# Patient Record
Sex: MALE | Race: WHITE | NOT HISPANIC OR LATINO | Employment: FULL TIME | URBAN - NONMETROPOLITAN AREA
[De-identification: names, ages, dates, MRNs, and addresses within clinical notes are randomized per-mention and may not be internally consistent; named-entity substitution may affect disease eponyms.]

---

## 2017-01-09 ENCOUNTER — AMBULATORY - GICH (OUTPATIENT)
Dept: SCHEDULING | Facility: OTHER | Age: 27
End: 2017-01-09

## 2017-01-09 ENCOUNTER — OFFICE VISIT (OUTPATIENT)
Dept: OTOLARYNGOLOGY | Facility: CLINIC | Age: 27
End: 2017-01-09

## 2017-01-09 DIAGNOSIS — J34.89 NASAL OBSTRUCTION: Primary | ICD-10-CM

## 2017-01-09 PROBLEM — H52.10 MYOPIA: Status: ACTIVE | Noted: 2017-01-09

## 2017-01-09 ASSESSMENT — PAIN SCALES - GENERAL: PAINLEVEL: NO PAIN (0)

## 2017-01-09 NOTE — Clinical Note
1/9/2017       RE: Ramo Freeman  [address] [facility] [address]     Dear Colleague,    Thank you for referring your patient, Ramo Freeman, to the [place] [facility] at [facility]. Please see a copy of my visit note below.    Facial Plastic and Reconstructive Surgery    Ramo Freeman is doing very well  His swelling is down significantly, and his breathing is optimal-  He is very pleased.    Anterior rhinoscopy shows a septum that is midline and widely patent nasal passages  His lining has recovered significantly.  Good tip support, good dorsum    I am very pleased and so is he.  I will see him back in a year but don't anticipate any changes.    Again, thank you for allowing me to participate in the care of your patient.      Sincerely,    Manda Arguello MD

## 2017-01-09 NOTE — PROGRESS NOTES
Facial Plastic and Reconstructive Surgery    Ramo Freeman is doing very well  His swelling is down significantly, and his breathing is optimal-  He is very pleased.    Anterior rhinoscopy shows a septum that is midline and widely patent nasal passages  His lining has recovered significantly.  Good tip support, good dorsum    I am very pleased and so is he.  I will see him back in a year but don't anticipate any changes.

## 2017-01-09 NOTE — PATIENT INSTRUCTIONS
Plan of Care:  1. Follow up in 6 months    Clinic Information:  1. To schedule an appointment please call [phone], option 1  2. To talk to a Triage RN please call [phone] select option 3  3. To speak to Dr. Arguello's nurse, Hemalatha, please call [phone]    Hemalatha Saravia RN  1/9/2017 4:02 PM

## 2017-01-09 NOTE — NURSING NOTE
Chief Complaint   Patient presents with     RECHECK     6 month f/u     Sara Sosa Medical Assistant

## 2017-04-07 ENCOUNTER — HISTORY (OUTPATIENT)
Dept: FAMILY MEDICINE | Facility: OTHER | Age: 27
End: 2017-04-07

## 2017-04-07 ENCOUNTER — OFFICE VISIT - GICH (OUTPATIENT)
Dept: FAMILY MEDICINE | Facility: OTHER | Age: 27
End: 2017-04-07

## 2017-04-07 DIAGNOSIS — J20.8 ACUTE BRONCHITIS DUE TO OTHER SPECIFIED ORGANISMS: ICD-10-CM

## 2017-04-07 DIAGNOSIS — F41.1 GENERALIZED ANXIETY DISORDER: ICD-10-CM

## 2017-04-07 DIAGNOSIS — H65.112: ICD-10-CM

## 2017-04-07 ASSESSMENT — ANXIETY QUESTIONNAIRES
GAD7 TOTAL SCORE: 9
5. BEING SO RESTLESS THAT IT IS HARD TO SIT STILL: SEVERAL DAYS
6. BECOMING EASILY ANNOYED OR IRRITABLE: SEVERAL DAYS
3. WORRYING TOO MUCH ABOUT DIFFERENT THINGS: SEVERAL DAYS
2. NOT BEING ABLE TO STOP OR CONTROL WORRYING: MORE THAN HALF THE DAYS
4. TROUBLE RELAXING: NOT AT ALL
1. FEELING NERVOUS, ANXIOUS, OR ON EDGE: NEARLY EVERY DAY
7. FEELING AFRAID AS IF SOMETHING AWFUL MIGHT HAPPEN: SEVERAL DAYS

## 2017-07-26 ENCOUNTER — AMBULATORY - GICH (OUTPATIENT)
Dept: SCHEDULING | Facility: OTHER | Age: 27
End: 2017-07-26

## 2017-07-26 ENCOUNTER — OFFICE VISIT (OUTPATIENT)
Dept: OTOLARYNGOLOGY | Facility: CLINIC | Age: 27
End: 2017-07-26

## 2017-07-26 VITALS — BODY MASS INDEX: 24.21 KG/M2 | WEIGHT: 164 LBS

## 2017-07-26 DIAGNOSIS — J34.89 NASAL OBSTRUCTION: Primary | ICD-10-CM

## 2017-07-26 ASSESSMENT — PAIN SCALES - GENERAL: PAINLEVEL: NO PAIN (0)

## 2017-07-26 NOTE — LETTER
7/26/2017       RE: Ramo Freeman  [address] [facility] [address]     Dear Colleague,    Thank you for referring your patient, Ramo Freeman, to the [facility] at [facility]. Please see a copy of my visit note below.    Facial Plastic and Reconstructive Surgery    History of Present Illness:  Ramo Freeman presents today a year after septorhinoplasty for nasal obstruction. He is doing very well. He finds his breathing is optimal. He is very pleased. He states the sensation is back in his nasal tip. In general he is doing very well.     Physical Examination:  On examination, he has good stable nasal support. He has good pliability to the nasal tip skin. He has nasal breathing bilaterally with no obstruction. Anterior rhinoscopy demonstrates a straight septum well supported nasal valve bilaterally. He has some crusting anteriorly which is within normal ranges. There is no evidence of any mucopurulence or polyps.    Assessment/Plan: As it has been a year since surgery, routine follow-up is no longer needed, but I can see Kenneth on an as-needed basis if any issues arise. I suspect that he will have an optimal course with nasal functioning after this with no significant issues or complications.       SLP/ms      Sincerely,    Manda Arguello MD

## 2017-07-26 NOTE — PROGRESS NOTES
Facial Plastic and Reconstructive Surgery    History of Present Illness:  Ramo Freeman presents today a year after septorhinoplasty for nasal obstruction. He is doing very well. He finds his breathing is optimal. He is very pleased. He states the sensation is back in his nasal tip. In general he is doing very well.     Physical Examination:  On examination, he has good stable nasal support. He has good pliability to the nasal tip skin. He has nasal breathing bilaterally with no obstruction. Anterior rhinoscopy demonstrates a straight septum well supported nasal valve bilaterally. He has some crusting anteriorly which is within normal ranges. There is no evidence of any mucopurulence or polyps.    Assessment/Plan: As it has been a year since surgery, routine follow-up is no longer needed, but I can see Kenneth on an as-needed basis if any issues arise. I suspect that he will have an optimal course with nasal functioning after this with no significant issues or complications.       SLP/ms

## 2017-07-26 NOTE — LETTER
7/26/2017      RE: Ramo Freeman  [address] [facility] [address]       Facial Plastic and Reconstructive Surgery    History of Present Illness:  Ramo Freeman presents today a year after septorhinoplasty for nasal obstruction. He is doing very well. He finds his breathing is optimal. He is very pleased. He states the sensation is back in his nasal tip. In general he is doing very well.     Physical Examination:  On examination, he has good stable nasal support. He has good pliability to the nasal tip skin. He has nasal breathing bilaterally with no obstruction. Anterior rhinoscopy demonstrates a straight septum well supported nasal valve bilaterally. He has some crusting anteriorly which is within normal ranges. There is no evidence of any mucopurulence or polyps.    Assessment/Plan: As it has been a year since surgery, routine follow-up is no longer needed, but I can see Kenneth on an as-needed basis if any issues arise. I suspect that he will have an optimal course with nasal functioning after this with no significant issues or complications.       SLP/ms            Manda Arguello MD

## 2017-07-26 NOTE — MR AVS SNAPSHOT
After Visit Summary   7/26/2017    Ramo Freeman    MRN: [id number]           Patient Information     Date Of Birth          1990        Visit Information        Provider Department      7/26/2017 4:40 PM Manda Arguello MD [facility] Ear Nose [facility] Throat        Today's Diagnoses     Nasal obstruction    -  1       Follow-ups after your visit        Who to contact     Please call your clinic at [phone] to:    Ask questions about your health    Make or cancel appointments    Discuss your medicines    Learn about your test results    Speak to your doctor   If you have compliments or concerns about an experience at your clinic, or if you wish to file a complaint, please contact [facility] Patient Relations at [phone] or email us at [email or web address]         Additional Information About Your Visit        MyChart Information     [employer] gives you secure access to your electronic health record. If you see a primary care provider, you can also send messages to your care team and make appointments. If you have questions, please call your primary care clinic.  If you do not have a primary care provider, please call [phone] and they will assist you.      [employer] is an electronic gateway that provides easy, online access to your medical records. With [employer], you can request a clinic appointment, read your test results, renew a prescription or communicate with your care team.     To access your existing account, please contact your [facility] or call [phone] for assistance.        Care EveryWhere ID     This is your Care EveryWhere ID. This could be used by other organizations to access your [facility] medical records  [id number]        Your Vitals Were     BMI (Body Mass Index)                   24.21 kg/m2            Blood Pressure from Last 3 Encounters:   11/23/15 137/72   11/18/15 131/80   07/09/15 128/81     Weight from Last 3 Encounters:   07/26/17 74.4 kg (164 lb)   11/23/15 66.7 kg (147 lb 0.8 oz)   11/18/15 67.7 kg (149 lb 3.2 oz)              Today, you had the following     No orders found for display       Primary Care Provider Office Phone # Fax #    Sotero Quach [phone]       [facility] [address] [place] [address]        Equal Access to Services     SOILA MCKEON : Hadii aad ku hadasho Soomaali, waaxda luqadaha, qaybta kaalmada adeegyada, waxay idiin hayaan adeeg emoryaramasoud lamartine . So [facility] [phone].    ATENCIÓN: Si habla español, tiene a connor disposición servicios gratuitos de asistencia lingüística. [facility] [phone].    We comply with applicable federal civil rights laws and [place] laws. We do not discriminate on the basis of race, color, national origin, age, disability sex, sexual orientation or gender identity.            Thank you!     Thank you for choosing [facility] EAR NOSE AND THROAT  for your care. Our goal is always to provide you with excellent care. Hearing back from our patients is one way we can continue to improve our services. Please take a few minutes to complete the written survey that you may receive in the mail after your visit with us. Thank you!             Your Updated Medication List - Protect others around you: Learn how to safely use, store and throw away your medicines at www.disposemymeds.org.          This list is accurate as of: 7/26/17 11:59 PM.  Always use your most recent med list.                   Brand Name Dispense Instructions for use Diagnosis    FAMOTIDINE PO      Take 20 mg by mouth daily as needed for heartburn        sertraline 50 MG tablet    ZOLOFT     Take 100 mg by mouth daily        VITAMIN D (CHOLECALCIFEROL) PO      Take 4,000 Units by mouth daily

## 2017-07-27 ENCOUNTER — HISTORY (OUTPATIENT)
Dept: FAMILY MEDICINE | Facility: OTHER | Age: 27
End: 2017-07-27

## 2017-07-27 ENCOUNTER — OFFICE VISIT - GICH (OUTPATIENT)
Dept: FAMILY MEDICINE | Facility: OTHER | Age: 27
End: 2017-07-27

## 2017-07-27 DIAGNOSIS — J03.90 ACUTE TONSILLITIS: ICD-10-CM

## 2017-07-27 DIAGNOSIS — R50.9 FEVER: ICD-10-CM

## 2017-07-27 DIAGNOSIS — J02.9 ACUTE PHARYNGITIS: ICD-10-CM

## 2017-07-27 DIAGNOSIS — J02.0 STREPTOCOCCAL PHARYNGITIS: ICD-10-CM

## 2017-07-27 LAB — STREP A ANTIGEN - HISTORICAL: NEGATIVE

## 2017-08-24 ENCOUNTER — COMMUNICATION - GICH (OUTPATIENT)
Dept: FAMILY MEDICINE | Facility: OTHER | Age: 27
End: 2017-08-24

## 2017-08-24 DIAGNOSIS — J30.89 OTHER ALLERGIC RHINITIS: ICD-10-CM

## 2017-12-27 NOTE — PROGRESS NOTES
Patient Information     Patient Name MRN Sex     Ramo Freeman [id number] Male 1990      Progress Notes by Fannie Hanna NP at 2017 12:30 PM     Author:  Fannie Hanna NP Service:  (none) Author Type:  PHYS- Nurse Practitioner     Filed:  2017  9:09 PM Encounter Date:  2017 Status:  Signed     :  Fannie Hanna NP (PHYS- Nurse Practitioner)            HPI:    Ramo Freeman is a 26 y.o. male who presents to clinic today for strep testing.   Sore throat x 3 days.  Painful to swallow.  Pain at its worst today.   Fevers started today.   Currently 101.5.  No runny or stuffy nose.  No cough.  Ear pressure/pain with swallowing.  Some headaches.  No nausea or vomiting.  No stomach aches.  Appetite decreased, no solids today, drinking fluids.  Energy decreased, low.  No tylenol or ibuprofen.            Past Medical History:     Diagnosis  Date     Erythroblastopenia (HC)     of childhood      Otitis media     Past history of recurrent - ruptured right tympanic membrane, and PE tubes.        Refusal of blood transfusions as patient is Baptism      Past Surgical History:      Procedure  Laterality Date     MYRINGOTOMY  99    and PE tubes times two.       Social History     Substance Use Topics       Smoking status: Never Smoker     Smokeless tobacco: Never Used     Alcohol use No     Current Outpatient Prescriptions       Medication  Sig Dispense Refill     albuterol HFA (PROAIR HFA) 90 mcg/actuation inhaler Inhale 2 Puffs by mouth every 4 hours if needed. 1 Inhaler 0     fexofenadine (ALLEGRA) 60 mg tablet Take 1 tablet by mouth 2 times daily. 180 tablet 3     fluticasone (50 mcg per actuation) nasal solution (FLONASE) Inhale 1 Spray into both nostrils once daily. 1 Bottle 12     sertraline (ZOLOFT) 100 mg tablet Take 1.5 tablets by mouth once daily. 135 tablet 3     No current facility-administered medications for this visit.      Medications have been  "reviewed by me and are current to the best of my knowledge and ability.    Allergies      Allergen   Reactions     Nyquil [Doxylamin-Pse-Dm-Acetaminophen]  GI Bleeding     Causes hyperactivity and then emesis        Past medical history, past surgical history, current medications and allergies reviewed and accurate to the best of my knowledge.        ROS:  Refer to HPI    /68  Pulse (!) 121  Temp (!) 101.5  F (38.6  C) (Tympanic)   Ht 1.765 m (5' 9.49\")  Wt 73.6 kg (162 lb 3.2 oz)  BMI 23.62 kg/m2    EXAM:  General Appearance: Well appearing adult male, appropriate appearance for age. No acute distress  Head: normocephalic, atraumatic  Ears: Left TM with bony landmarks appreciated, no erythema, no effusion, no bulging, no purulence.  Right TM with bony landmarks appreciated, no erythema, no effusion, no bulging, no purulence.   Left auditory canal clear.  Right auditory canal clear.  Normal external ears, non tender.  Eyes: conjunctivae normal, no drainage  Orophayrnx: moist mucous membranes, posterior pharynx with bright erythema, tonsils with 2+ hypertrophy with bright erythema and white exudates, no petechiae    Neck: mild bilateral tonsillar lymph node enlargement and tenderness  Respiratory: normal chest wall and respirations.  Normal effort.  Clear to auscultation bilaterally, no wheezing, crackles or rhonchi.  No increased work of breathing.  No cough appreciated  Cardiac: RRR with no murmurs  Musculoskeletal:  Normal gait.  Equal movement of bilateral upper extremities.  Equal movement of bilateral lower extremities.    Psychological: normal affect, alert and pleasant      Labs:  Results for orders placed or performed in visit on 07/27/17      RAPID STREP WITH REFLEX CULTURE      Result  Value Ref Range    STREP A ANTIGEN           Negative Negative         ASSESSMENT/PLAN:    ICD-10-CM    1. Sore throat J02.9 RAPID STREP WITH REFLEX CULTURE      RAPID STREP WITH REFLEX CULTURE   2. Strep " pharyngitis J02.0 penicillin v potassium (PEN-VEE K) 500 mg tablet   3. Tonsillitis with exudate J03.90    4. Fever in adult R50.9          Negative rapid strep test  Exam consistent with strep infection and fever of 101.5  Pen Vee K 500 mg BID x 10 days   New toothbrush in 2-3 days   Encouraged fluids  Symptomatic treatment - salt water gargles, honey, elevation, lozenges, etc   Tylenol or ibuprofen PRN  Follow up if symptoms persist or worsen or concerns          Patient Instructions   Penicillin twice daily x 10 days     New toothbrush in 2 days    Salt water gargles       Tylenol or ibuprofen      Drink plenty of fluids      Follow up if worsening or concerns

## 2017-12-28 NOTE — TELEPHONE ENCOUNTER
Patient Information     Patient Name MRN Ramo Carr [id number] Male 1990      Telephone Encounter by Araceli Gallardo RN at 2017  4:05 PM     Author:  Araceli Gallardo RN Service:  (none) Author Type:  NURS- Registered Nurse     Filed:  2017  4:07 PM Encounter Date:  2017 Status:  Signed     :  Araceli Gallardo RN (NURS- Registered Nurse)            Antihistamines:    Office visit in the past 12 months.    Last visit with YINA CHRISTIE was on: 2017 in [facility]  Next visit with YINA CHRISTIE is on: No future appointment listed with this provider  Next visit with Family Practice is on: No future appointment listed in this department    Max refills 12 months from last office visit.        Prescription refilled per RN Medication Refill Policy.................... Araceli Gallardo RN ....................  2017   4:06 PM

## 2017-12-28 NOTE — PATIENT INSTRUCTIONS
Patient Information     Patient Name MRN Sex Ramo Godinez [id number] Male 1990      Patient Instructions by Fannie Hanna NP at 2017 12:30 PM     Author:  Fannie Hanna NP Service:  (none) Author Type:  PHYS- Nurse Practitioner     Filed:  2017 12:57 PM Encounter Date:  2017 Status:  Signed     :  Fannie Hanna NP (PHYS- Nurse Practitioner)            Penicillin twice daily x 10 days     New toothbrush in 2 days    Salt water gargles       Tylenol or ibuprofen      Drink plenty of fluids      Follow up if worsening or concerns

## 2017-12-30 NOTE — NURSING NOTE
Patient Information     Patient Name MRN Ramo Carr [id number] Male 1990      Nursing Note by Juanita Newton at 2017 12:30 PM     Author:  Juanita Newton Service:  (none) Author Type:  NURS- Student Practical Nurse     Filed:  2017 12:51 PM Encounter Date:  2017 Status:  Signed     :  Juanita Newton (NURS- Student Practical Nurse)            Patient presents with sore throat, dizziness, fever for 3 days. Juanita Newton LPN .............2017  12:26 PM

## 2018-01-04 NOTE — NURSING NOTE
Patient Information     Patient Name MRN Ramo Carr [id number] Male 1990      Nursing Note by Sana Johns at 2017  9:30 AM     Author:  Sana Johns Service:  (none) Author Type:  (none)     Filed:  2017  9:33 AM Encounter Date:  2017 Status:  Signed     :  Sana Johns            Coming in with symptoms of allergies, watery eyes, cough, itchy eyes, also anxiety medication is not working.  Sana Johns ....................  2017   9:28 AM

## 2018-01-04 NOTE — PROGRESS NOTES
"Patient Information     Patient Name MRN Ramo Carr [id number] Male 1990      Progress Notes by Sotero Quach MD at 2017  9:30 AM     Author:  Sotero Quach MD Service:  (none) Author Type:  Physician     Filed:  2017  9:51 AM Encounter Date:  2017 Status:  Signed     :  Sotero Quach MD (Physician)            SUBJECTIVE:    Ramo Freeman is a 26 y.o. male who presents for possible allergy symptoms as well as worsening anxiety    HPI   Has had coughing with post nasal drainage lately.  Sneezing and maxillary sinus pressure.  Itchy eyes and throat.  Decreased hearing bilateral.  Symptoms for about 2 weeks.  Often gets this in the spring and fall, but never this severe.  Had a fever 2 days last week.  Took ibuprofen and it resolved.    Lately has been much more sleepy and feels the zoloft is not working.  Feeling down and depressed a \"little bit\".  Seems to worry about \"a lot of things\".  Cannot really think of examples, simply feels on edge nearly all the time.  Has been sharing these feelings with his parents, who he finds very helpful.  He is thinking about meeting with a counselor as well.  Sometimes has a hard time sleeping, tries essential oils with some relief.    Allergies      Allergen   Reactions     Nyquil [Doxylamin-Pse-Dm-Acetaminophen]  GI Bleeding     Causes hyperactivity and then emesis    ,   Current Outpatient Prescriptions on File Prior to Visit       Medication  Sig Dispense Refill     fexofenadine (ALLEGRA) 60 mg tablet Take 1 tablet by mouth 2 times daily. 180 tablet 3     fluticasone (50 mcg per actuation) nasal solution (FLONASE) Inhale 1 Spray into both nostrils once daily. 1 Bottle 12     No current facility-administered medications on file prior to visit.    ,   Past Medical History:     Diagnosis  Date     Erythroblastopenia (HC)     of childhood      Otitis media     Past history of recurrent - ruptured right tympanic membrane, and " PE tubes.        Refusal of blood transfusions as patient is Rastafari     and   Past Surgical History:      Procedure  Laterality Date     MYRINGOTOMY  06/18/99    and PE tubes times two.         REVIEW OF SYSTEMS:  Review of Systems   Constitutional: Positive for fever.   HENT: Positive for congestion.    Respiratory: Positive for cough. Negative for shortness of breath.    Skin: Negative for itching and rash.   Neurological: Positive for headaches.   Psychiatric/Behavioral: Positive for depression. Negative for substance abuse. The patient is nervous/anxious and has insomnia.        OBJECTIVE:  /62  Temp 98.3  F (36.8  C) (Tympanic)  Resp 16  Wt 69.8 kg (153 lb 12.8 oz)  BMI 23.21 kg/m2    EXAM:   Physical Exam   Constitutional: He is well-developed, well-nourished, and in no distress. No distress.   HENT:   Head: Normocephalic and atraumatic.   Left tympanic membrane with significant mucoid white effusion, right with serous effusion   Eyes: Conjunctivae are normal. Pupils are equal, round, and reactive to light.   Pulmonary/Chest: Effort normal. No respiratory distress. He has wheezes. He has rales.   Bilateral inspiratory wheeze and rales   Skin: Skin is warm and dry. No rash noted. He is not diaphoretic. No erythema.   Psychiatric: Memory, affect and judgment normal.     BLAISE-7 ANXIETY SCREENING 4/7/2017   BLAISE date (doc flow) 4/7/2017   Nervous, anxious 3   Cannot stop worrying 2   Worry about different things 1   Cannot relax 0   Feeling restless 1   Easily annoyed/irritated 1   Afraid of awful event 1   Score 9   Severity mild anxiety       ASSESSMENT/PLAN:    ICD-10-CM    1. Acute viral bronchitis J20.8 albuterol HFA (PROAIR HFA) 90 mcg/actuation inhaler   2. BLAISE (generalized anxiety disorder) F41.1 sertraline (ZOLOFT) 100 mg tablet   3. Acute mucoid otitis media of left ear H65.112         Plan:  The ear findings along with the fever make me think he had either influenza or an acute otitis  media last week.  Sine no fevers now for 1 week, will simply need to treat symptoms with the inhaler.  He might have seasonal allergic rhinitis on top of this and as such is also more prone to katherine asthma.  If not improving in a few weeks, consider spirometry.  If fever returns, then needs a chest radiograph.      For the anxiety, will increase the zoloft to 150 mg daily and he is now ready to start counseling.  I gave him the list of counselors in the area.  Follow up with me as needed on this.    Sotero Quach MD ....................  4/7/2017   9:50 AM

## 2018-01-27 VITALS
HEIGHT: 69 IN | HEART RATE: 121 BPM | SYSTOLIC BLOOD PRESSURE: 120 MMHG | BODY MASS INDEX: 24.02 KG/M2 | SYSTOLIC BLOOD PRESSURE: 112 MMHG | WEIGHT: 162.2 LBS | DIASTOLIC BLOOD PRESSURE: 68 MMHG | RESPIRATION RATE: 16 BRPM | TEMPERATURE: 98.3 F | DIASTOLIC BLOOD PRESSURE: 62 MMHG | WEIGHT: 153.8 LBS | TEMPERATURE: 101.5 F | BODY MASS INDEX: 22.7 KG/M2

## 2018-01-29 ASSESSMENT — ANXIETY QUESTIONNAIRES: GAD7 TOTAL SCORE: 9

## 2018-01-30 ENCOUNTER — DOCUMENTATION ONLY (OUTPATIENT)
Dept: FAMILY MEDICINE | Facility: OTHER | Age: 28
End: 2018-01-30

## 2018-01-30 RX ORDER — ALBUTEROL SULFATE 90 UG/1
2 AEROSOL, METERED RESPIRATORY (INHALATION) EVERY 4 HOURS PRN
COMMUNITY
Start: 2017-04-07

## 2018-01-30 RX ORDER — SERTRALINE HYDROCHLORIDE 100 MG/1
150 TABLET, FILM COATED ORAL DAILY
COMMUNITY
Start: 2017-04-07 | End: 2018-03-31

## 2018-01-30 RX ORDER — FEXOFENADINE HCL 60 MG/1
60 TABLET, FILM COATED ORAL 2 TIMES DAILY
COMMUNITY
Start: 2017-08-28 | End: 2018-06-10

## 2018-03-31 DIAGNOSIS — F41.1 GAD (GENERALIZED ANXIETY DISORDER): Primary | ICD-10-CM

## 2018-03-31 NOTE — LETTER
April 4, 2018      Ramo Freeman  [address] [facility] [address]        Dear Ramo,       This letter is to remind you that you are due for your annual exam and medication management appointment with Sotero Quach. Your last comprehensive visit was more than 12 months ago.    A LIMITED refill of sertraline HCl (Zoloft) has been called into your pharmacy. Additional refills require you to complete this appointment.    Please call the clinic at [phone] to schedule your appointment.    If you should require additional refills before your scheduled appointment, please contact your pharmacy and we will refill your medication until the date of your appointment.    If you are no longer seeing Sotero Quach for primary care, please call to let us know. Doing so will remove you from our call/contact list.      Thank you for choosing [facility] and [facility] for your health care needs.    Sincerely,    Refill RN  [facility]          Sincerely,        Sotero Quach MD

## 2018-04-04 RX ORDER — SERTRALINE HYDROCHLORIDE 100 MG/1
TABLET, FILM COATED ORAL
Qty: 45 TABLET | Refills: 0 | Status: SHIPPED | OUTPATIENT
Start: 2018-04-04 | End: 2018-04-27

## 2018-04-04 NOTE — TELEPHONE ENCOUNTER
[facility] pharmacy and pt request a Rx refill for sertaline HCl (Zoloft).  SSRI protocol passed.  Pt's last exam appt with PCP DR. CHERELLE Quach was on 4-7-17.  Patient is due for medication management appointment. Limited refill provided at this time.  Letter sent for reminder to patient. Prescription refilled per RN Medication Refill Policy.................... Melissa Olivera ....................  4/4/2018   10:20 AM

## 2018-04-27 ENCOUNTER — OFFICE VISIT (OUTPATIENT)
Dept: FAMILY MEDICINE | Facility: OTHER | Age: 28
End: 2018-04-27
Attending: FAMILY MEDICINE
Payer: COMMERCIAL

## 2018-04-27 VITALS
RESPIRATION RATE: 16 BRPM | WEIGHT: 172 LBS | BODY MASS INDEX: 25.48 KG/M2 | DIASTOLIC BLOOD PRESSURE: 62 MMHG | SYSTOLIC BLOOD PRESSURE: 122 MMHG | HEART RATE: 62 BPM | HEIGHT: 69 IN

## 2018-04-27 DIAGNOSIS — F41.1 GAD (GENERALIZED ANXIETY DISORDER): ICD-10-CM

## 2018-04-27 DIAGNOSIS — Z00.00 ROUTINE GENERAL MEDICAL EXAMINATION AT A HEALTH CARE FACILITY: Primary | ICD-10-CM

## 2018-04-27 PROCEDURE — G0463 HOSPITAL OUTPT CLINIC VISIT: HCPCS | Performed by: FAMILY MEDICINE

## 2018-04-27 PROCEDURE — 99395 PREV VISIT EST AGE 18-39: CPT | Performed by: FAMILY MEDICINE

## 2018-04-27 RX ORDER — SERTRALINE HYDROCHLORIDE 100 MG/1
150 TABLET, FILM COATED ORAL DAILY
Qty: 135 TABLET | Refills: 3 | Status: SHIPPED | OUTPATIENT
Start: 2018-04-27 | End: 2019-04-17

## 2018-04-27 ASSESSMENT — ANXIETY QUESTIONNAIRES
GAD7 TOTAL SCORE: 0
5. BEING SO RESTLESS THAT IT IS HARD TO SIT STILL: NOT AT ALL
6. BECOMING EASILY ANNOYED OR IRRITABLE: NOT AT ALL
7. FEELING AFRAID AS IF SOMETHING AWFUL MIGHT HAPPEN: NOT AT ALL
3. WORRYING TOO MUCH ABOUT DIFFERENT THINGS: NOT AT ALL
IF YOU CHECKED OFF ANY PROBLEMS ON THIS QUESTIONNAIRE, HOW DIFFICULT HAVE THESE PROBLEMS MADE IT FOR YOU TO DO YOUR WORK, TAKE CARE OF THINGS AT HOME, OR GET ALONG WITH OTHER PEOPLE: NOT DIFFICULT AT ALL
1. FEELING NERVOUS, ANXIOUS, OR ON EDGE: NOT AT ALL
2. NOT BEING ABLE TO STOP OR CONTROL WORRYING: NOT AT ALL

## 2018-04-27 ASSESSMENT — PAIN SCALES - GENERAL: PAINLEVEL: NO PAIN (0)

## 2018-04-27 ASSESSMENT — PATIENT HEALTH QUESTIONNAIRE - PHQ9: 5. POOR APPETITE OR OVEREATING: NOT AT ALL

## 2018-04-27 NOTE — PROGRESS NOTES
SUBJECTIVE:   CC: Ramo Freeman is an 27 year old male who presents for preventative health visit.     Healthy Habits:    Do you get at least three servings of calcium containing foods daily (dairy, green leafy vegetables, etc.)? yes    Amount of exercise or daily activities, outside of work: 0 day(s) per week    Problems taking medications regularly No    Medication side effects: No    Have you had an eye exam in the past two years? no    Do you see a dentist twice per year? no    Do you have sleep apnea, excessive snoring or daytime drowsiness?no     Has no significant concerns.  Mood is great.  No issues taking Zoloft.  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    Today's PHQ-2 Score:   PHQ-2 ( 1999 Pfizer) 4/27/2018   Q1: Little interest or pleasure in doing things 0   Q2: Feeling down, depressed or hopeless 0   PHQ-2 Score 0       Abuse: Current or Past(Physical, Sexual or Emotional)- No  Do you feel safe in your environment - No    Social History   Substance Use Topics     Smoking status: Never Smoker     Smokeless tobacco: Never Used     Alcohol use No      If you drink alcohol do you typically have >3 drinks per day or >7 drinks per week? No                      Last PSA: No results found for: PSA    Reviewed orders with patient. 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     Procedure and treatment not carried out because of patient's decision for reasons of belief and group pressure     No Comments Provided      Past Surgical History:   Procedure Laterality Date     OTHER SURGICAL HISTORY       "06/18/99,GSP238,MYRINGOTOMY,and PE tubes times two.       ROS:  C: NEGATIVE for fever, chills, change in weight  I: NEGATIVE for worrisome rashes, moles or lesions  E: NEGATIVE for vision changes or irritation  ENT: NEGATIVE for ear, mouth and throat problems  R: NEGATIVE for significant cough or SOB  CV: NEGATIVE for chest pain, palpitations or peripheral edema  GI: NEGATIVE for nausea, abdominal pain, heartburn, or change in bowel habits   male: negative for dysuria, hematuria, decreased urinary stream, erectile dysfunction, urethral discharge  M: NEGATIVE for significant arthralgias or myalgia  N: NEGATIVE for weakness, dizziness or paresthesias  P: NEGATIVE for changes in mood or affect    OBJECTIVE:   /62 (BP Location: Right arm, Patient Position: Sitting, Cuff Size: Adult Regular)  Pulse 62  Resp 16  Ht 5' 9\" (1.753 m)  Wt 172 lb (78 kg)  BMI 25.4 kg/m2  EXAM:  GENERAL: healthy, alert and no distress  EYES: Eyes grossly normal to inspection, PERRL and conjunctivae and sclerae normal  HENT: ear canals and TM's normal, nose and mouth without ulcers or lesions  NECK: no adenopathy, no asymmetry, masses, or scars and thyroid normal to palpation  RESP: lungs clear to auscultation - no rales, rhonchi or wheezes  CV: regular rate and rhythm, normal S1 S2, no S3 or S4, no murmur, click or rub, no peripheral edema and peripheral pulses strong  ABDOMEN: soft, nontender, no hepatosplenomegaly, no masses and bowel sounds normal  MS: no gross musculoskeletal defects noted, no edema  SKIN: no suspicious lesions or rashes  NEURO: Normal strength and tone, mentation intact and speech normal  PSYCH: mentation appears normal, affect normal/bright    ASSESSMENT/PLAN:   (Z00.00) Routine general medical examination at a health care facility  (primary encounter diagnosis)  Comment: healthy  Plan: follow up in 1 year    (F41.1) BLAISE (generalized anxiety disorder)  Comment: stable  Plan: sertraline (ZOLOFT) 100 MG " "tablet        Refilled this for him      COUNSELING:  Reviewed preventive health counseling, as reflected in patient instructions       Regular exercise       Healthy diet/nutrition       Vision screening       reports that he has never smoked. He has never used smokeless tobacco.    Estimated body mass index is 25.4 kg/(m^2) as calculated from the following:    Height as of this encounter: 5' 9\" (1.753 m).    Weight as of this encounter: 172 lb (78 kg).       Counseling Resources:  ATP IV Guidelines  Pooled Cohorts Equation Calculator  FRAX Risk Assessment  ICSI Preventive Guidelines  Dietary Guidelines for Americans, 2010  USDA's MyPlate  ASA Prophylaxis  Lung CA Screening    Sotero Quach MD  [facility]  "

## 2018-04-27 NOTE — MR AVS SNAPSHOT
After Visit Summary   4/27/2018    Ramo Freeman    MRN: [id number]           Patient Information     Date Of Birth          1990        Visit Information        Provider Department      4/27/2018 12:15 PM Sotero Quach MD [facility]        Today's Diagnoses     Routine general medical examination at a health care facility    -  1    BLAISE (generalized anxiety disorder)          Care Instructions      Preventive Health Recommendations  Male Ages 18 - 25     Yearly exam:             See your health care provider every year in order to  o   Review health changes.   o   Discuss preventive care.    o   Review your medicines if your doctor has prescribed any.    You should be tested each year for STDs (sexually transmitted diseases).     Talk to your provider about cholesterol testing.      If you are at risk for diabetes, you should have a diabetes test (fasting glucose).    Shots: Get a flu shot each year. Get a tetanus shot every 10 years.     Nutrition:    Eat at least 5 servings of fruits and vegetables daily.     Eat whole-grain bread, whole-wheat pasta and brown rice instead of white grains and rice.     Talk to your provider about calcium and Vitamin D.     Lifestyle    Exercise for at least 150 minutes a week (30 minutes a day, 5 days a week). This will help you control your weight and prevent disease.     Limit alcohol to one drink per day.     No smoking.     Wear sunscreen to prevent skin cancer.     See your dentist every six months for an exam and cleaning.             Follow-ups after your visit        Follow-up notes from your care team     Return in about 1 year (around 4/27/2019).      Who to contact     If you have questions or need follow up information about today's clinic visit or your schedule please contact [facility] directly at [phone].  Normal or non-critical lab and imaging results will be communicated to you by George  "letter or phone within 4 business days after the clinic has received the results. If you do not hear from us within 7 days, please contact the clinic through [employer] or phone. If you have a critical or abnormal lab result, we will notify you by phone as soon as possible.  Submit refill requests through [employer] or call your pharmacy and they will forward the refill request to us. Please allow 3 business days for your refill to be completed.          Additional Information About Your Visit        [facility] Information     [employer] gives you secure access to your electronic health record. If you see a primary care provider, you can also send messages to your care team and make appointments. If you have questions, please call your primary care clinic.  If you do not have a primary care provider, please call [phone] and they will assist you.        Care EveryWhere ID     This is your Care EveryWhere ID. This could be used by other organizations to access your [place] medical records  [id number]        Your Vitals Were     Pulse Respirations Height BMI (Body Mass Index)          62 16 5' 9\" (1.753 m) 25.4 kg/m2         Blood Pressure from Last 3 Encounters:   04/27/18 122/62   07/27/17 112/68   04/07/17 120/62    Weight from Last 3 Encounters:   04/27/18 172 lb (78 kg)   07/27/17 162 lb 3.2 oz (73.6 kg)   07/26/17 164 lb (74.4 kg)              Today, you had the following     No orders found for display         Today's Medication Changes          These changes are accurate as of 4/27/18 12:27 PM.  If you have any questions, ask your nurse or doctor.               These medicines have changed or have updated prescriptions.        Dose/Directions    sertraline 100 MG tablet   Commonly known as:  ZOLOFT   This may have changed:  See the new instructions.   Used for:  BLAISE (generalized anxiety disorder)   Changed by:  Sotero Quach MD        Dose:  150 mg   Take 1.5 tablets (150 mg) by mouth daily   Quantity:  135 tablet "   Refills:  3            Where to get your medicines      These medications were sent to [facility] [address] [facility] - [place], [place] - [address]  [address] [facility] [address]     Phone:  [phone]     sertraline 100 MG tablet                Primary Care Provider Office Phone # Fax #    Sotero Quach [phone]       [address]  [facility] [address]        Equal Access to Services     RYAN MCKEON : Hadii aad ku hadasho Soomaali, waaxda luqadaha, qaybta kaalmada adeegyada, waxay idiin hayaan emlo lechuga . So [facility] [phone].    ATENCIÓN: Si habla español, tiene a connor disposición servicios gratuitos de asistencia lingüística. Llame al [phone].    We comply with applicable federal civil rights laws and [place] laws. We do not discriminate on the basis of race, color, national origin, age, disability, sex, sexual orientation, or gender identity.            Thank you!     Thank you for choosing [facility]  for your care. Our goal is always to provide you with excellent care. Hearing back from our patients is one way we can continue to improve our services. Please take a few minutes to complete the written survey that you may receive in the mail after your visit with us. Thank you!             Your Updated Medication List - Protect others around you: Learn how to safely use, store and throw away your medicines at www.disposemymeds.org.          This list is accurate as of 4/27/18 12:27 PM.  Always use your most recent med list.                   Brand Name Dispense Instructions for use Diagnosis    albuterol 108 (90 Base) MCG/ACT Inhaler    PROAIR HFA/PROVENTIL HFA/VENTOLIN HFA     Inhale 2 puffs into the lungs every 4 hours as needed        ALLEGRA ALLERGY 60 MG tablet   Generic drug:  fexofenadine      Take 60 mg by mouth 2 times daily        FAMOTIDINE PO      Take 20 mg by mouth daily as needed for heartburn         sertraline 100 MG tablet    ZOLOFT    135 tablet    Take 1.5 tablets (150 mg) by mouth daily    BLAISE (generalized anxiety disorder)       VITAMIN D (CHOLECALCIFEROL) PO      Take 4,000 Units by mouth daily

## 2018-04-28 ASSESSMENT — ANXIETY QUESTIONNAIRES: GAD7 TOTAL SCORE: 0

## 2018-06-10 DIAGNOSIS — J30.9 ALLERGIC RHINITIS: Primary | ICD-10-CM

## 2018-06-13 RX ORDER — FEXOFENADINE HCL 60 MG/1
TABLET, FILM COATED ORAL
Qty: 180 TABLET | Refills: 2 | Status: SHIPPED | OUTPATIENT
Start: 2018-06-13 | End: 2019-09-19

## 2018-06-13 NOTE — TELEPHONE ENCOUNTER
"Requested Prescriptions   Pending Prescriptions Disp Refills     fexofenadine (ALLEGRA) 60 MG tablet [Pharmacy Med Name: FEXOFENADINE HCL 60 MG TABLET] 180 tablet 2     Sig: TAKE 1 TABLET BY MOUTH TWICE A DAY    Antihistamines Protocol Passed    6/10/2018 11:19 AM       Passed - Patient is 3-64 years of age    Apply weight-based dosing for peds patients age 3 - 12 years of age.    Forward request to provider for patients under the age of 3 or over the age of 64.         Passed - Recent (12 mo) or future (30 days) visit within the authorizing provider's specialty    Patient had office visit in the last 12 months or has a visit in the next 30 days with authorizing provider or within the authorizing provider's specialty.  See \"Patient Info\" tab in inbasket, or \"Choose Columns\" in Meds & Orders section of the refill encounter.          Refilled per RN refill protocol. Rosi Peter RN on 6/13/2018 at 9:00 AM     "

## 2019-01-10 ENCOUNTER — DOCUMENTATION ONLY (OUTPATIENT)
Dept: OTHER | Facility: CLINIC | Age: 29
End: 2019-01-10

## 2019-04-17 DIAGNOSIS — F41.1 GAD (GENERALIZED ANXIETY DISORDER): ICD-10-CM

## 2019-04-18 RX ORDER — SERTRALINE HYDROCHLORIDE 100 MG/1
150 TABLET, FILM COATED ORAL DAILY
Qty: 135 TABLET | Refills: 0 | Status: SHIPPED | OUTPATIENT
Start: 2019-04-18 | End: 2019-07-18

## 2019-04-18 NOTE — TELEPHONE ENCOUNTER
"Requested Prescriptions   Pending Prescriptions Disp Refills     sertraline (ZOLOFT) 100 MG tablet [Pharmacy Med Name: SERTRALINE  MG TABLET] 135 tablet 3     Sig: TAKE 1.5 TABLETS (150 MG) BY MOUTH DAILY       SSRIs Protocol Passed - 4/17/2019  3:40 PM        Passed - Recent (12 mo) or future (30 days) visit within the authorizing provider's specialty     Patient had office visit in the last 12 months or has a visit in the next 30 days with authorizing provider or within the authorizing provider's specialty.  See \"Patient Info\" tab in inbasket, or \"Choose Columns\" in Meds & Orders section of the refill encounter.              Passed - Medication is active on med list        Passed - Patient is age 18 or older        Pt is due to be seen in the next month. Prescription approved per [facility] Refill Protocol.  Rosi Peter RN on 4/18/2019 at 4:11 PM   "

## 2019-07-18 DIAGNOSIS — F41.1 GAD (GENERALIZED ANXIETY DISORDER): ICD-10-CM

## 2019-07-18 NOTE — LETTER
July 23, 2019      Ramo Freeman  [address] [address]        Dear Ramo,     A LIMITED refill of sertraline has been called into your pharmacy.    Additional refills require an appointment with Sotero Quach MD.  Please call the clinic at [phone] to schedule your appointment.    Thank you,        The Refill Nurse  [facility]

## 2019-07-23 RX ORDER — SERTRALINE HYDROCHLORIDE 100 MG/1
150 TABLET, FILM COATED ORAL DAILY
Qty: 135 TABLET | Refills: 0 | Status: SHIPPED | OUTPATIENT
Start: 2019-07-23 | End: 2019-09-19

## 2019-07-23 NOTE — TELEPHONE ENCOUNTER
"Refill request from [facility] for sertraline 100 mg tablet  Last refill  4/18/19 for #135 and 0R  Last OV with PCP  4/27/18  No future appts noted      Requested Prescriptions   Pending Prescriptions Disp Refills     sertraline (ZOLOFT) 100 MG tablet [Pharmacy Med Name: SERTRALINE  MG TABLET] 45 tablet 2     Sig: TAKE 1.5 TABLETS (150 MG) BY MOUTH DAILY       SSRIs Protocol Failed - 7/18/2019  9:42 AM        Failed - Recent (12 mo) or future (30 days) visit within the authorizing provider's specialty     Patient had office visit in the last 12 months or has a visit in the next 30 days with authorizing provider or within the authorizing provider's specialty.  See \"Patient Info\" tab in inbasket, or \"Choose Columns\" in Meds & Orders section of the refill encounter.         Pt due for annual visit.  90 day umer period refill and letter sent.  Prescription approved per [facility] Refill Protocol.  Melissa Hamilton RN............................ 7/23/2019 11:30 AM    "

## 2019-09-19 ENCOUNTER — OFFICE VISIT (OUTPATIENT)
Dept: FAMILY MEDICINE | Facility: OTHER | Age: 29
End: 2019-09-19
Attending: FAMILY MEDICINE
Payer: COMMERCIAL

## 2019-09-19 VITALS
HEIGHT: 69 IN | WEIGHT: 169.4 LBS | TEMPERATURE: 98.7 F | DIASTOLIC BLOOD PRESSURE: 62 MMHG | RESPIRATION RATE: 14 BRPM | SYSTOLIC BLOOD PRESSURE: 126 MMHG | OXYGEN SATURATION: 97 % | BODY MASS INDEX: 25.09 KG/M2 | HEART RATE: 84 BPM

## 2019-09-19 DIAGNOSIS — J30.9 ALLERGIC RHINITIS, UNSPECIFIED SEASONALITY, UNSPECIFIED TRIGGER: ICD-10-CM

## 2019-09-19 DIAGNOSIS — F41.1 GAD (GENERALIZED ANXIETY DISORDER): Primary | ICD-10-CM

## 2019-09-19 PROCEDURE — 99213 OFFICE O/P EST LOW 20 MIN: CPT | Performed by: FAMILY MEDICINE

## 2019-09-19 PROCEDURE — G0463 HOSPITAL OUTPT CLINIC VISIT: HCPCS

## 2019-09-19 RX ORDER — SERTRALINE HYDROCHLORIDE 100 MG/1
150 TABLET, FILM COATED ORAL DAILY
Qty: 135 TABLET | Refills: 3 | Status: SHIPPED | OUTPATIENT
Start: 2019-09-19 | End: 2020-09-24

## 2019-09-19 RX ORDER — SERTRALINE HYDROCHLORIDE 100 MG/1
150 TABLET, FILM COATED ORAL DAILY
Qty: 135 TABLET | Refills: 0 | Status: SHIPPED | OUTPATIENT
Start: 2019-09-19 | End: 2019-09-19

## 2019-09-19 RX ORDER — FEXOFENADINE HCL 60 MG/1
60 TABLET, FILM COATED ORAL 2 TIMES DAILY
Qty: 180 TABLET | Refills: 3 | Status: SHIPPED | OUTPATIENT
Start: 2019-09-19 | End: 2020-09-24

## 2019-09-19 ASSESSMENT — ANXIETY QUESTIONNAIRES
2. NOT BEING ABLE TO STOP OR CONTROL WORRYING: NOT AT ALL
GAD7 TOTAL SCORE: 0
7. FEELING AFRAID AS IF SOMETHING AWFUL MIGHT HAPPEN: NOT AT ALL
IF YOU CHECKED OFF ANY PROBLEMS ON THIS QUESTIONNAIRE, HOW DIFFICULT HAVE THESE PROBLEMS MADE IT FOR YOU TO DO YOUR WORK, TAKE CARE OF THINGS AT HOME, OR GET ALONG WITH OTHER PEOPLE: NOT DIFFICULT AT ALL
3. WORRYING TOO MUCH ABOUT DIFFERENT THINGS: NOT AT ALL
1. FEELING NERVOUS, ANXIOUS, OR ON EDGE: NOT AT ALL
5. BEING SO RESTLESS THAT IT IS HARD TO SIT STILL: NOT AT ALL
6. BECOMING EASILY ANNOYED OR IRRITABLE: NOT AT ALL

## 2019-09-19 ASSESSMENT — PAIN SCALES - GENERAL: PAINLEVEL: NO PAIN (0)

## 2019-09-19 ASSESSMENT — PATIENT HEALTH QUESTIONNAIRE - PHQ9: 5. POOR APPETITE OR OVEREATING: NOT AT ALL

## 2019-09-19 ASSESSMENT — ENCOUNTER SYMPTOMS
FATIGUE: 0
FEVER: 0
SLEEP DISTURBANCE: 0
NERVOUS/ANXIOUS: 0
SHORTNESS OF BREATH: 0

## 2019-09-19 ASSESSMENT — MIFFLIN-ST. JEOR: SCORE: 1734.8

## 2019-09-19 NOTE — NURSING NOTE
"coming in for a medication check up    Chief Complaint   Patient presents with     Recheck Medication     check up       Initial /62 (BP Location: Right arm, Patient Position: Sitting, Cuff Size: Adult Large)   Pulse 84   Temp 98.7  F (37.1  C) (Tympanic)   Resp 14   Ht 1.746 m (5' 8.75\")   Wt 76.8 kg (169 lb 6.4 oz)   SpO2 97%   BMI 25.20 kg/m   Estimated body mass index is 25.2 kg/m  as calculated from the following:    Height as of this encounter: 1.746 m (5' 8.75\").    Weight as of this encounter: 76.8 kg (169 lb 6.4 oz).  Medication Reconciliation: complete    Sana Johns LPN  "

## 2019-09-19 NOTE — PROGRESS NOTES
SUBJECTIVE:   Ramo Freeman is a 28 year old male who presents to clinic today for the following health issues:    HPI  Follow up on zoloft.  Has been on this for a few years.  Has no side effects and feels it has worked really well.  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  He has been keeping in touch with a girl he met at a wedding.  She lives in [place].  Is hopeful this will lead to something more.    BLAISE-7 SCORE 4/7/2017 4/27/2018 9/19/2019   Total Score 9 0 0           Patient Active Problem List    Diagnosis Date Noted     Myopia 01/09/2017     Priority: Medium     Nasal obstruction 01/09/2017     Priority: Medium     Allergic rhinitis 09/08/2016     Priority: Medium     Snoring      Priority: Medium     Acquired nasal deformity 10/08/2015     Priority: Medium     Deviated nasal septum 10/08/2015     Priority: Medium     Nasal valve stenosis 10/08/2015     Priority: Medium     BLAISE (generalized anxiety disorder) 09/09/2015     Priority: Medium     Panic attacks 08/06/2015     Priority: Medium     Refusal of blood transfusions as patient is Yazdanism 08/06/2015     Priority: Medium     ETD (eustachian tube dysfunction) 05/20/2013     Priority: Medium     History of myringotomy 05/20/2013     Priority: Medium     Past Surgical History:   Procedure Laterality Date     OTHER SURGICAL HISTORY      06/18/99,[id number],MYRINGOTOMY,and PE tubes times two.     Social History     Tobacco Use     Smoking status: Never Smoker     Smokeless tobacco: Never Used   Substance Use Topics     Alcohol use: No     Current Outpatient Medications   Medication Sig Dispense Refill     albuterol (PROAIR HFA/PROVENTIL HFA/VENTOLIN HFA) 108 (90 BASE) MCG/ACT Inhaler Inhale 2 puffs into the lungs every 4 hours as needed       FAMOTIDINE PO Take 20 mg by mouth daily as needed for heartburn       fexofenadine (ALLEGRA) 60 MG tablet Take 1 tablet (60 mg) by mouth 2 times daily 180 tablet 3  "    sertraline (ZOLOFT) 100 MG tablet Take 1.5 tablets (150 mg) by mouth daily 135 tablet 3     VITAMIN D, CHOLECALCIFEROL, PO Take 4,000 Units by mouth daily       Allergies   Allergen Reactions     Nyquil [Nite Time] Nausea and Vomiting     hyper     [place] Nite Time GI Disturbance     Causes hyperactivity and then emesis       Review of Systems   Constitutional: Negative for fatigue and fever.   Respiratory: Negative for shortness of breath.    Cardiovascular: Negative for chest pain.   Psychiatric/Behavioral: Negative for sleep disturbance. The patient is not nervous/anxious.         OBJECTIVE:     /62 (BP Location: Right arm, Patient Position: Sitting, Cuff Size: Adult Large)   Pulse 84   Temp 98.7  F (37.1  C) (Tympanic)   Resp 14   Ht 1.746 m (5' 8.75\")   Wt 76.8 kg (169 lb 6.4 oz)   SpO2 97%   BMI 25.20 kg/m    Body mass index is 25.2 kg/m .  Physical Exam   Constitutional: He is oriented to person, place, and time. He appears well-developed and well-nourished. No distress.   Neurological: He is alert and oriented to person, place, and time.   Skin: He is not diaphoretic.   Psychiatric: He has a normal mood and affect. His behavior is normal. Thought content normal.       Diagnostic Test Results:  none     ASSESSMENT/PLAN:         (F41.1) BLAISE (generalized anxiety disorder)  (primary encounter diagnosis)  Comment: well controlled  Plan: sertraline (ZOLOFT) 100 MG tablet,         DISCONTINUED: sertraline (ZOLOFT) 100 MG tablet        refilled at 150 milligram daily, follow up in 1 year.    (J30.9) Allergic rhinitis, unspecified seasonality, unspecified trigger  Comment: stable  Plan: fexofenadine (ALLEGRA) 60 MG tablet        Refilled as well.          Sotero Quach MD  [facility]    "

## 2019-09-20 ASSESSMENT — ANXIETY QUESTIONNAIRES: GAD7 TOTAL SCORE: 0

## 2020-03-02 ENCOUNTER — HEALTH MAINTENANCE LETTER (OUTPATIENT)
Age: 30
End: 2020-03-02

## 2020-09-24 DIAGNOSIS — F41.1 GAD (GENERALIZED ANXIETY DISORDER): ICD-10-CM

## 2020-09-24 DIAGNOSIS — J30.9 ALLERGIC RHINITIS, UNSPECIFIED SEASONALITY, UNSPECIFIED TRIGGER: ICD-10-CM

## 2020-09-24 RX ORDER — FEXOFENADINE HCL 60 MG/1
60 TABLET, FILM COATED ORAL 2 TIMES DAILY
Qty: 60 TABLET | Refills: 0 | Status: SHIPPED | OUTPATIENT
Start: 2020-09-24 | End: 2020-10-22

## 2020-09-24 RX ORDER — SERTRALINE HYDROCHLORIDE 100 MG/1
TABLET, FILM COATED ORAL
Qty: 45 TABLET | Refills: 0 | Status: SHIPPED | OUTPATIENT
Start: 2020-09-24 | End: 2020-10-05

## 2020-09-24 NOTE — TELEPHONE ENCOUNTER
Patients mother says patient needs more meds to get him thru to his appt on 10/5 - please call to advise  Shalini Hector on 9/24/2020 at 1:17 PM

## 2020-10-05 ENCOUNTER — OFFICE VISIT (OUTPATIENT)
Dept: FAMILY MEDICINE | Facility: OTHER | Age: 30
End: 2020-10-05
Attending: FAMILY MEDICINE
Payer: COMMERCIAL

## 2020-10-05 VITALS
SYSTOLIC BLOOD PRESSURE: 112 MMHG | WEIGHT: 171.6 LBS | DIASTOLIC BLOOD PRESSURE: 68 MMHG | BODY MASS INDEX: 25.53 KG/M2 | OXYGEN SATURATION: 97 % | TEMPERATURE: 98.6 F | HEART RATE: 70 BPM | RESPIRATION RATE: 16 BRPM

## 2020-10-05 DIAGNOSIS — F41.1 GAD (GENERALIZED ANXIETY DISORDER): ICD-10-CM

## 2020-10-05 DIAGNOSIS — Z23 NEED FOR IMMUNIZATION AGAINST INFLUENZA: Primary | ICD-10-CM

## 2020-10-05 PROCEDURE — 90686 IIV4 VACC NO PRSV 0.5 ML IM: CPT

## 2020-10-05 PROCEDURE — 99213 OFFICE O/P EST LOW 20 MIN: CPT | Performed by: FAMILY MEDICINE

## 2020-10-05 PROCEDURE — 90686 IIV4 VACC NO PRSV 0.5 ML IM: CPT | Performed by: FAMILY MEDICINE

## 2020-10-05 PROCEDURE — G0463 HOSPITAL OUTPT CLINIC VISIT: HCPCS

## 2020-10-05 RX ORDER — SERTRALINE HYDROCHLORIDE 100 MG/1
100 TABLET, FILM COATED ORAL DAILY
Qty: 90 TABLET | Refills: 3 | Status: SHIPPED | OUTPATIENT
Start: 2020-10-05 | End: 2020-12-08

## 2020-10-05 ASSESSMENT — ENCOUNTER SYMPTOMS
SLEEP DISTURBANCE: 0
FATIGUE: 0
DYSPHORIC MOOD: 0
FEVER: 0
HEADACHES: 0
NERVOUS/ANXIOUS: 0

## 2020-10-05 ASSESSMENT — ANXIETY QUESTIONNAIRES
1. FEELING NERVOUS, ANXIOUS, OR ON EDGE: NOT AT ALL
7. FEELING AFRAID AS IF SOMETHING AWFUL MIGHT HAPPEN: NOT AT ALL
5. BEING SO RESTLESS THAT IT IS HARD TO SIT STILL: NOT AT ALL
3. WORRYING TOO MUCH ABOUT DIFFERENT THINGS: NOT AT ALL
GAD7 TOTAL SCORE: 0
6. BECOMING EASILY ANNOYED OR IRRITABLE: NOT AT ALL
IF YOU CHECKED OFF ANY PROBLEMS ON THIS QUESTIONNAIRE, HOW DIFFICULT HAVE THESE PROBLEMS MADE IT FOR YOU TO DO YOUR WORK, TAKE CARE OF THINGS AT HOME, OR GET ALONG WITH OTHER PEOPLE: NOT DIFFICULT AT ALL
2. NOT BEING ABLE TO STOP OR CONTROL WORRYING: NOT AT ALL

## 2020-10-05 ASSESSMENT — PAIN SCALES - GENERAL: PAINLEVEL: NO PAIN (0)

## 2020-10-05 ASSESSMENT — PATIENT HEALTH QUESTIONNAIRE - PHQ9: 5. POOR APPETITE OR OVEREATING: NOT AT ALL

## 2020-10-05 NOTE — NURSING NOTE
"Coming in for a medication check up    Chief Complaint   Patient presents with     Recheck Medication     check up       Initial /68   Pulse 70   Temp 98.6  F (37  C)   Resp 16   Wt 77.8 kg (171 lb 9.6 oz)   SpO2 97%   BMI 25.53 kg/m   Estimated body mass index is 25.53 kg/m  as calculated from the following:    Height as of 9/19/19: 1.746 m (5' 8.75\").    Weight as of this encounter: 77.8 kg (171 lb 9.6 oz).  Medication Reconciliation: complete    Sana Johns LPN  "

## 2020-10-05 NOTE — PROGRESS NOTES
SUBJECTIVE:   Ramo Freeman is a 29 year old male who presents to clinic today for the following health issues:    HPI  Follow up on zoloft.  With COVID, he is working 3 days a week, on purpose.  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  Anxiety is really well controlled.  He feels the meds are working well.  No sig side effects at all.  Has been on meds for 5 years now.  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  Was not before he got on the meds.    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     Procedure and treatment not carried out because of patient's decision for reasons of belief and group pressure     No Comments Provided      Past Surgical History:   Procedure Laterality Date     OTHER SURGICAL HISTORY      06/18/99,[id number],MYRINGOTOMY,and PE tubes times two.     Social History     Tobacco Use     Smoking status: Never Smoker     Smokeless tobacco: Never Used   Substance Use Topics     Alcohol use: No     Current Outpatient Medications   Medication Sig Dispense Refill     albuterol (PROAIR HFA/PROVENTIL HFA/VENTOLIN HFA) 108 (90 BASE) MCG/ACT Inhaler Inhale 2 puffs into the lungs every 4 hours as needed       FAMOTIDINE PO Take 20 mg by mouth daily as needed for heartburn       fexofenadine (ALLEGRA) 60 MG tablet TAKE 1 TABLET (60 MG) BY MOUTH 2 TIMES DAILY 60 tablet 0     sertraline (ZOLOFT) 100 MG tablet TAKE 1.5 TABLETS BY MOUTH DAILY 45 tablet 0     VITAMIN D, CHOLECALCIFEROL, PO Take 4,000 Units by mouth daily       Allergies   Allergen Reactions     Nyquil [Nite Time] Nausea and Vomiting     hyper     [place] Nite Time GI Disturbance     Causes hyperactivity and then emesis       Review of Systems   Constitutional: Negative for fatigue and fever.   Neurological: Negative  for headaches.   Psychiatric/Behavioral: Negative for dysphoric mood and sleep disturbance. The patient is not nervous/anxious.         OBJECTIVE:     /68   Pulse 70   Temp 98.6  F (37  C)   Resp 16   Wt 77.8 kg (171 lb 9.6 oz)   SpO2 97%   BMI 25.53 kg/m    Body mass index is 25.53 kg/m .  Physical Exam  Constitutional:       Appearance: Normal appearance.   Neurological:      General: No focal deficit present.      Mental Status: He is alert and oriented to person, place, and time.   Psychiatric:         Mood and Affect: Mood normal.         Behavior: Behavior normal.         Thought Content: Thought content normal.             ASSESSMENT/PLAN:         (F41.1) BLAISE (generalized anxiety disorder)  Comment: stable.  Plan: sertraline (ZOLOFT) 100 MG tablet        15 minutes with him, over half in counseling and coordination of care.          Sotero Quach MD  [facility]

## 2020-10-06 ASSESSMENT — ANXIETY QUESTIONNAIRES: GAD7 TOTAL SCORE: 0

## 2020-10-21 DIAGNOSIS — J30.9 ALLERGIC RHINITIS, UNSPECIFIED SEASONALITY, UNSPECIFIED TRIGGER: ICD-10-CM

## 2020-10-21 DIAGNOSIS — F41.1 GAD (GENERALIZED ANXIETY DISORDER): ICD-10-CM

## 2020-10-22 RX ORDER — FEXOFENADINE HCL 60 MG/1
60 TABLET, FILM COATED ORAL 2 TIMES DAILY
Qty: 60 TABLET | Refills: 6 | Status: SHIPPED | OUTPATIENT
Start: 2020-10-22

## 2020-10-22 RX ORDER — SERTRALINE HYDROCHLORIDE 100 MG/1
TABLET, FILM COATED ORAL
Qty: 45 TABLET | Refills: 0 | OUTPATIENT
Start: 2020-10-22

## 2020-10-22 NOTE — TELEPHONE ENCOUNTER
[facility] Rx request for the following:   fexofenadine (ALLEGRA) 60 MG tablet  Sig: TAKE 1 TABLET (60 MG) BY MOUTH 2 TIMES DAILY - Oral    Last Prescription Date:   09/24/2020  Last Fill Qty/Refills:         60, R-0    Last Office Visit:              10/05/2020   Future Office visit:           none  Antihistamines Protocol Passed  Prescription refilled per RN Medication Refill Policy      [facility] sent Rx request for the following:   sertraline (ZOLOFT) 100 MG tablet  Sig: Take 1 tablet (100 mg) by mouth daily - Oral    Last Prescription Date:   10/05/2020  Last Fill Qty/Refills:         90, R-3    Last Office Visit:              10/05/2020   Future Office visit:           none  Redundant refill request refused: Too soon      Lyn Gonzalez RN  ....................  10/22/2020   8:21 AM

## 2020-12-05 DIAGNOSIS — F41.1 GAD (GENERALIZED ANXIETY DISORDER): ICD-10-CM

## 2020-12-07 RX ORDER — SERTRALINE HYDROCHLORIDE 100 MG/1
TABLET, FILM COATED ORAL
Qty: 45 TABLET | OUTPATIENT
Start: 2020-12-07

## 2020-12-07 NOTE — TELEPHONE ENCOUNTER
[facility] in #[id number] [place] sent Rx request for the following:      Requested Prescriptions   Pending Prescriptions Disp Refills   sertraline (ZOLOFT) 100 MG tablet [Pharmacy Med Name: SERTRALINE  MG TABLET] 45 tablet     Sig: TAKE 1 AND 1/2 TABLETS BY MOUTH EVERY DAY     Not on active medication list: Last ordered 9/24/20, discontinued 10/5/20 and the following was ordered:  sertraline (ZOLOFT) 100 MG tablet 90 tablet 3 10/5/2020  No   Sig - Route: Take 1 tablet (100 mg) by mouth daily - Oral     Refill request refused, with note to pharmacy. Unable to complete prescription refill per RN Medication Refill Policy. Melissa Ambriz RN .............. 12/7/2020  11:04 AM

## 2020-12-08 ENCOUNTER — TELEPHONE (OUTPATIENT)
Dept: FAMILY MEDICINE | Facility: OTHER | Age: 30
End: 2020-12-08

## 2020-12-08 DIAGNOSIS — F41.1 GAD (GENERALIZED ANXIETY DISORDER): ICD-10-CM

## 2020-12-08 RX ORDER — SERTRALINE HYDROCHLORIDE 100 MG/1
100 TABLET, FILM COATED ORAL DAILY
Qty: 90 TABLET | Refills: 3 | Status: SHIPPED | OUTPATIENT
Start: 2020-12-08 | End: 2021-12-09

## 2020-12-08 NOTE — TELEPHONE ENCOUNTER
They are having trouble with one of that patients medications.   
Unable to consent due to medical condition

## 2020-12-31 ENCOUNTER — OFFICE VISIT (OUTPATIENT)
Dept: FAMILY MEDICINE | Facility: OTHER | Age: 30
End: 2020-12-31
Attending: NURSE PRACTITIONER
Payer: COMMERCIAL

## 2020-12-31 ENCOUNTER — HOSPITAL ENCOUNTER (OUTPATIENT)
Dept: CT IMAGING | Facility: OTHER | Age: 30
End: 2020-12-31
Attending: NURSE PRACTITIONER
Payer: COMMERCIAL

## 2020-12-31 VITALS
OXYGEN SATURATION: 98 % | SYSTOLIC BLOOD PRESSURE: 116 MMHG | DIASTOLIC BLOOD PRESSURE: 64 MMHG | HEART RATE: 71 BPM | BODY MASS INDEX: 25.98 KG/M2 | HEIGHT: 70 IN | WEIGHT: 181.5 LBS | RESPIRATION RATE: 16 BRPM | TEMPERATURE: 97.4 F

## 2020-12-31 DIAGNOSIS — R31.0 GROSS HEMATURIA: ICD-10-CM

## 2020-12-31 DIAGNOSIS — N20.2 CALCULUS OF KIDNEY WITH CALCULUS OF URETER: ICD-10-CM

## 2020-12-31 DIAGNOSIS — R10.9 RIGHT FLANK PAIN: ICD-10-CM

## 2020-12-31 DIAGNOSIS — N20.1 CALCULUS OF PROXIMAL RIGHT URETER: Primary | ICD-10-CM

## 2020-12-31 LAB
ALBUMIN UR-MCNC: NEGATIVE MG/DL
ANION GAP SERPL CALCULATED.3IONS-SCNC: 6 MMOL/L (ref 3–14)
APPEARANCE UR: CLEAR
BACTERIA #/AREA URNS HPF: ABNORMAL /HPF
BASOPHILS # BLD AUTO: 0.1 10E9/L (ref 0–0.2)
BASOPHILS NFR BLD AUTO: 0.4 %
BILIRUB UR QL STRIP: NEGATIVE
BUN SERPL-MCNC: 14 MG/DL (ref 7–25)
CALCIUM SERPL-MCNC: 9.6 MG/DL (ref 8.6–10.3)
CHLORIDE SERPL-SCNC: 102 MMOL/L (ref 98–107)
CO2 SERPL-SCNC: 30 MMOL/L (ref 21–31)
COLOR UR AUTO: ABNORMAL
CREAT SERPL-MCNC: 1.47 MG/DL (ref 0.7–1.3)
DIFFERENTIAL METHOD BLD: ABNORMAL
EOSINOPHIL # BLD AUTO: 0.1 10E9/L (ref 0–0.7)
EOSINOPHIL NFR BLD AUTO: 0.6 %
ERYTHROCYTE [DISTWIDTH] IN BLOOD BY AUTOMATED COUNT: 12.2 % (ref 10–15)
GFR SERPL CREATININE-BSD FRML MDRD: 57 ML/MIN/{1.73_M2}
GLUCOSE SERPL-MCNC: 110 MG/DL (ref 70–105)
GLUCOSE UR STRIP-MCNC: NEGATIVE MG/DL
HCT VFR BLD AUTO: 40.9 % (ref 40–53)
HGB BLD-MCNC: 14.8 G/DL (ref 13.3–17.7)
HGB UR QL STRIP: ABNORMAL
IMM GRANULOCYTES # BLD: 0 10E9/L (ref 0–0.4)
IMM GRANULOCYTES NFR BLD: 0.3 %
KETONES UR STRIP-MCNC: NEGATIVE MG/DL
LEUKOCYTE ESTERASE UR QL STRIP: NEGATIVE
LYMPHOCYTES # BLD AUTO: 2.2 10E9/L (ref 0.8–5.3)
LYMPHOCYTES NFR BLD AUTO: 18 %
MCH RBC QN AUTO: 29.1 PG (ref 26.5–33)
MCHC RBC AUTO-ENTMCNC: 36.2 G/DL (ref 31.5–36.5)
MCV RBC AUTO: 81 FL (ref 78–100)
MONOCYTES # BLD AUTO: 1.3 10E9/L (ref 0–1.3)
MONOCYTES NFR BLD AUTO: 10.7 %
MUCOUS THREADS #/AREA URNS LPF: PRESENT /LPF
NEUTROPHILS # BLD AUTO: 8.5 10E9/L (ref 1.6–8.3)
NEUTROPHILS NFR BLD AUTO: 70 %
NITRATE UR QL: NEGATIVE
PH UR STRIP: 6 PH (ref 5–7)
PLATELET # BLD AUTO: 382 10E9/L (ref 150–450)
POTASSIUM SERPL-SCNC: 4 MMOL/L (ref 3.5–5.1)
RBC # BLD AUTO: 5.08 10E12/L (ref 4.4–5.9)
RBC #/AREA URNS AUTO: 14 /HPF (ref 0–2)
SODIUM SERPL-SCNC: 138 MMOL/L (ref 134–144)
SOURCE: ABNORMAL
SP GR UR STRIP: 1.01 (ref 1–1.03)
UROBILINOGEN UR STRIP-MCNC: NORMAL MG/DL (ref 0–2)
WBC # BLD AUTO: 12.2 10E9/L (ref 4–11)
WBC #/AREA URNS AUTO: 2 /HPF (ref 0–5)

## 2020-12-31 PROCEDURE — 36415 COLL VENOUS BLD VENIPUNCTURE: CPT | Mod: ZL | Performed by: NURSE PRACTITIONER

## 2020-12-31 PROCEDURE — 255N000002 HC RX 255 OP 636: Performed by: NURSE PRACTITIONER

## 2020-12-31 PROCEDURE — 80048 BASIC METABOLIC PNL TOTAL CA: CPT | Mod: ZL | Performed by: NURSE PRACTITIONER

## 2020-12-31 PROCEDURE — 74178 CT ABD&PLV WO CNTR FLWD CNTR: CPT

## 2020-12-31 PROCEDURE — 99214 OFFICE O/P EST MOD 30 MIN: CPT | Performed by: NURSE PRACTITIONER

## 2020-12-31 PROCEDURE — 81001 URINALYSIS AUTO W/SCOPE: CPT | Mod: ZL | Performed by: NURSE PRACTITIONER

## 2020-12-31 PROCEDURE — 85025 COMPLETE CBC W/AUTO DIFF WBC: CPT | Mod: ZL | Performed by: NURSE PRACTITIONER

## 2020-12-31 PROCEDURE — 87086 URINE CULTURE/COLONY COUNT: CPT | Mod: ZL | Performed by: NURSE PRACTITIONER

## 2020-12-31 PROCEDURE — G0463 HOSPITAL OUTPT CLINIC VISIT: HCPCS | Mod: 25

## 2020-12-31 PROCEDURE — G0463 HOSPITAL OUTPT CLINIC VISIT: HCPCS

## 2020-12-31 RX ORDER — SULFAMETHOXAZOLE/TRIMETHOPRIM 800-160 MG
1 TABLET ORAL 2 TIMES DAILY
Qty: 6 TABLET | Refills: 0 | Status: SHIPPED | OUTPATIENT
Start: 2020-12-31 | End: 2021-01-03

## 2020-12-31 RX ADMIN — IOHEXOL 120 ML: 350 INJECTION, SOLUTION INTRAVENOUS at 15:29

## 2020-12-31 ASSESSMENT — PAIN SCALES - GENERAL: PAINLEVEL: EXTREME PAIN (8)

## 2020-12-31 ASSESSMENT — MIFFLIN-ST. JEOR: SCORE: 1799.53

## 2020-12-31 NOTE — PATIENT INSTRUCTIONS
Major risk factors for calcium stones  Urinary   Lower volume   Higher calcium   Higher oxalate (CaOx stones)   Lower citrate   Higher pH (CaP stones)   Anatomic   Medullary sponge kidney   Horseshoe kidney   Diet   Lower fluid intake   Lower dietary calcium   Higher oxalate   Lower potassium   Higher sodium   Higher sucrose   Higher fructose   Lower phytate   Higher vitamin C   Other medical conditions   Primary hyperparathyroidism   Gout   Obesity   Diabetes mellitus   Distal renal tubular acidosis   Inflammatory bowel disease   Malabsorptive bariatric surgery   Short bowel syndrome   CaOx: calcium oxalate; CaP: calcium phosphate.        Patient Education     Kidney Stone with Pain    The sharp cramping pain on either side of your lower back and nausea/vomiting that you have are because of a small stone that has formed in the kidney. It is now passing down a narrow tube (ureter) on its way to your bladder. Once the stone reaches your bladder, the pain will often stop. But it may come back as the stone continues to pass out of the bladder and through the urethra. The stone may pass in your urine stream in one piece. The size may be 1/16 inch to 1/4 inch (1 mm to 6 mm). Or, the stone may break up into yolie fragments that you may not even notice.  Once you have had a kidney stone, you are at risk of getting another one in the future. There are 4 types of kidney stones. Eighty percent are calcium stones--mostly calcium oxalate but also some with calcium phosphate. The other 3 types include uric acid stones, struvite stones (from a preceding infection), and rarely, cystine stones.  Most stones will pass on their own, but may take from a few hours to a few days. Sometimes the stone is too large to pass by itself. In that case, the healthcare provider will need to use other ways to remove the stone. These techniques include:    Lithotripsy. This uses ultrasound waves to break up the stone.    Ureteroscopy. This pushes a  basket-like instrument through the urethra and bladder and into the ureter to pull out the stone.    Various types of direct surgery through the skin  Home care  The following are general care guidelines:    Drink plenty of fluids. This means at least 12, 8-ounce glasses of fluid--mostly water--a day.    Each time you urinate, do so in a jar. Pour the urine from the jar through the strainer and into the toilet. Continue doing this until 24 hours after your pain stops. By then, if there was a kidney stone, it should pass from your bladder. Some stones dissolve into sand-like particles and pass right through the strainer. In that case, you won t ever see a stone.    Save any stone that you find in the strainer and bring it to your healthcare provider to look at. It may be possible to stop certain types of stones from forming. For this reason, it is important to know what kind of stone you have.    Try to stay as active as possible. This will help the stone pass. Don't stay in bed unless your pain keeps you from getting up. You may notice a red, pink, or brown color to your urine. This is normal while passing a kidney stone.    If you develop pain, you may take ibuprofen or naproxen for pain, unless another medicine was prescribed. If you have chronic liver or kidney disease, talk with your healthcare provider before taking these medicines. Also talk with your provider if you've had a stomach ulcer or GI bleeding.  Preventing stones  Each year for the next 5 to 7 years, you are at risk that a new stone will form. Your risk is a 50% chance over this time period. The risk is higher if you have a family history of kidney stones or have certain chronic illnesses like hypertension, obesity, or diabetes. But you can make changes to your lifestyle and diet that can lower your risk for another stone.  Most kidney stones are made of calcium. The following is advice for preventing another calcium stone. If you don t know the  type of stone you have, follow this advice until the cause of your stone is found.  Things that help:    The most important thing you can do is to drink plenty of fluids each day. See home care above.     Eat foods that contain phytates. These include wheat, rice, rye, barley, and beans. Phytates are substances that may lower your risk for any type of stone to form.    Eat more fruits and vegetables. Choose those that are high in potassium.    Eat foods high in natural citrate like fruit and low-sugar fruit juices.    Having too little calcium in your diet can put you at risk for calcium kidney stones. Eat a normal amount of calcium in your diet and talk with your healthcare provider if you are taking calcium supplements. Cutting back on your calcium intake may raise your risk. New research shows that eating calcium-rich and oxalate-rich foods together lowers your risk for stones by binding the minerals in the stomach and intestines before they can reach the kidneys.      Limit salt intake to 2 grams (1 teaspoon) per day. Use limited amounts when cooking, and don t add salt at the table. Processed and canned foods are usually high in salt.     Spinach, rhubarb, peanuts, cashews, almonds, grapefruit, and grapefruit juice are all high oxalate foods. You should limit how much of these you eat. Or eat them with calcium-rich foods. These include dairy products, dark leafy greens, soy products, and calcium-enriched foods.    Reducing the amount of animal meat and high protein foods in your diet may lower your risk for uric acid stones.    Avoid excess sugar (sucrose) and fructose (sweetener in many soft drinks) in your diet.     If you take vitamin C as a supplement, don't take more than 1,000 mg a day.    A dietitian or your healthcare provider can give you information about changes in your diet that will help prevent more kidney stones from forming.  Follow-up care  Follow up with your healthcare provider, or as  advised, if the pain lasts more than 48 hours. Talk with your provider about urine and blood tests to find out the cause of your stone. If you had an X-ray, CT scan, or other diagnostic test, you will be told of any new findings that may affect your care.  Call 911  Call 911 if you have any of these:    Weakness, dizziness, or fainting  When to seek medical advice  Call your healthcare provider right away if any of these occur:    Pain that is not controlled by the medicine given    Repeated vomiting or unable to keep down fluids    Fever of 100.4 F (38 C) or higher, or as directed by your healthcare provider    Passage of solid red or brown urine (can't see through it) or urine with lots of blood clots    Foul-smelling or cloudy urine    Unable to pass urine for 8 hours and increasing bladder pressure  [employer] last reviewed this educational content on 10/1/2016    8473-6320 [employer]. [address] [place] [address]. All rights reserved. This information is not intended as a substitute for professional medical care. Always follow your healthcare professional's instructions.           Patient Education     Understanding Kidney Stones  Your kidneys are bean-shaped organs. They help filter extra salts, waste, and water from your body. You need to drink enough water every day to help flush the extra salts into your urine.     What are kidney stones?  Kidney stones are made up of chemical crystals that separate out from urine. These crystals clump together to make stones. They form in the calyx of the kidney. They may stay in the kidney or move into the urinary tract.   Why kidney stones form  Kidneys form stones for many reasons. If you don t drink enough water, for instance, you won t have enough urine to dilute chemicals. Then the chemicals may form crystals, which can develop into stones. Here are some reasons why kidney stones form:    Fluid loss (dehydration). This can concentrate urine,  causing stones to form.    Certain foods. Some foods contain large amounts of the chemicals that sometimes crystallize into stones. Eating foods that contain a lot of meat or salt can lead to more kidney stones.    Kidney infections. These infections foster stones by slowing urine flow or changing the acid balance of your urine.    Family history. If family members have had kidney stones, you re more likely to have them, too.    A lack of certain substances in your urine. Some substances can help protect you from forming stones. If you don t have enough of these in your urine, stone formation can increase.  Where stones form  Stones begin in the cup-shaped part of the kidney (calyx). Some stay in the calyx and grow. Others move into the kidney, pelvis, or into the ureter. There they can lodge, block the flow of urine, and cause pain.  Symptoms  Many stones cause sudden and severe pain and bloody urine. Others cause nausea or frequent, burning urination. Symptoms often depend on your stone s size and location. Fever may indicate a serious infection. Call your healthcare provider right away if you develop a fever.  [employer] last reviewed this educational content on 1/1/2017 2000-2020 [employer]. [address] [place] [address], [place] [address]. All rights reserved. This information is not intended as a substitute for professional medical care. Always follow your healthcare professional's instructions.

## 2020-12-31 NOTE — NURSING NOTE
"Chief Complaint   Patient presents with     Urinary Problem     Patient is here for pain in his right lower back that started yesterday. Patient states he also has blood in his urine. Patient states he was putting a snowblower back yesterday around 2-3pm and thought he may have pulled a muscle but the pain has not subsided since.     Initial /64   Pulse 71   Temp 97.4  F (36.3  C) (Tympanic)   Resp 16   Ht 1.778 m (5' 10\")   Wt 82.3 kg (181 lb 8 oz)   SpO2 98%   BMI 26.04 kg/m   Estimated body mass index is 26.04 kg/m  as calculated from the following:    Height as of this encounter: 1.778 m (5' 10\").    Weight as of this encounter: 82.3 kg (181 lb 8 oz).  Medication Reconciliation: complete    Jayleen Sage, CHER  "

## 2021-01-03 LAB
BACTERIA SPEC CULT: NORMAL
SPECIMEN SOURCE: NORMAL

## 2021-01-08 ENCOUNTER — OFFICE VISIT (OUTPATIENT)
Dept: FAMILY MEDICINE | Facility: OTHER | Age: 31
End: 2021-01-08
Attending: FAMILY MEDICINE
Payer: COMMERCIAL

## 2021-01-08 VITALS
BODY MASS INDEX: 24.91 KG/M2 | TEMPERATURE: 97.6 F | WEIGHT: 173.6 LBS | SYSTOLIC BLOOD PRESSURE: 116 MMHG | OXYGEN SATURATION: 99 % | RESPIRATION RATE: 16 BRPM | HEART RATE: 71 BPM | DIASTOLIC BLOOD PRESSURE: 62 MMHG

## 2021-01-08 DIAGNOSIS — N20.2 CALCULUS OF KIDNEY WITH CALCULUS OF URETER: Primary | ICD-10-CM

## 2021-01-08 LAB
ANION GAP SERPL CALCULATED.3IONS-SCNC: 6 MMOL/L (ref 3–14)
BUN SERPL-MCNC: 14 MG/DL (ref 7–25)
CALCIUM SERPL-MCNC: 9.8 MG/DL (ref 8.6–10.3)
CHLORIDE SERPL-SCNC: 101 MMOL/L (ref 98–107)
CO2 SERPL-SCNC: 31 MMOL/L (ref 21–31)
CREAT SERPL-MCNC: 0.89 MG/DL (ref 0.7–1.3)
DEPRECATED CALCIDIOL+CALCIFEROL SERPL-MC: 52.3 NG/ML
GFR SERPL CREATININE-BSD FRML MDRD: >90 ML/MIN/{1.73_M2}
GLUCOSE SERPL-MCNC: 102 MG/DL (ref 70–105)
POTASSIUM SERPL-SCNC: 3.8 MMOL/L (ref 3.5–5.1)
SODIUM SERPL-SCNC: 138 MMOL/L (ref 134–144)

## 2021-01-08 PROCEDURE — 82306 VITAMIN D 25 HYDROXY: CPT | Mod: ZL | Performed by: FAMILY MEDICINE

## 2021-01-08 PROCEDURE — 99213 OFFICE O/P EST LOW 20 MIN: CPT | Performed by: FAMILY MEDICINE

## 2021-01-08 PROCEDURE — G0463 HOSPITAL OUTPT CLINIC VISIT: HCPCS

## 2021-01-08 PROCEDURE — 82365 CALCULUS SPECTROSCOPY: CPT | Mod: ZL | Performed by: FAMILY MEDICINE

## 2021-01-08 PROCEDURE — 80048 BASIC METABOLIC PNL TOTAL CA: CPT | Mod: ZL | Performed by: FAMILY MEDICINE

## 2021-01-08 PROCEDURE — 36415 COLL VENOUS BLD VENIPUNCTURE: CPT | Mod: ZL | Performed by: FAMILY MEDICINE

## 2021-01-08 ASSESSMENT — ANXIETY QUESTIONNAIRES
3. WORRYING TOO MUCH ABOUT DIFFERENT THINGS: NOT AT ALL
7. FEELING AFRAID AS IF SOMETHING AWFUL MIGHT HAPPEN: NOT AT ALL
GAD7 TOTAL SCORE: 0
5. BEING SO RESTLESS THAT IT IS HARD TO SIT STILL: NOT AT ALL
1. FEELING NERVOUS, ANXIOUS, OR ON EDGE: NOT AT ALL
6. BECOMING EASILY ANNOYED OR IRRITABLE: NOT AT ALL
IF YOU CHECKED OFF ANY PROBLEMS ON THIS QUESTIONNAIRE, HOW DIFFICULT HAVE THESE PROBLEMS MADE IT FOR YOU TO DO YOUR WORK, TAKE CARE OF THINGS AT HOME, OR GET ALONG WITH OTHER PEOPLE: NOT DIFFICULT AT ALL
2. NOT BEING ABLE TO STOP OR CONTROL WORRYING: NOT AT ALL

## 2021-01-08 ASSESSMENT — PAIN SCALES - GENERAL: PAINLEVEL: NO PAIN (0)

## 2021-01-08 ASSESSMENT — PATIENT HEALTH QUESTIONNAIRE - PHQ9: 5. POOR APPETITE OR OVEREATING: NOT AT ALL

## 2021-01-08 NOTE — NURSING NOTE
"Coming in for a f/u from a kidney stone    Chief Complaint   Patient presents with     Hospital F/U     kidney stone       Initial /62   Pulse 71   Temp 97.6  F (36.4  C)   Resp 16   Wt 78.7 kg (173 lb 9.6 oz)   SpO2 99%   BMI 24.91 kg/m   Estimated body mass index is 24.91 kg/m  as calculated from the following:    Height as of 12/31/20: 1.778 m (5' 10\").    Weight as of this encounter: 78.7 kg (173 lb 9.6 oz).  Medication Reconciliation: complete    Sana Johns LPN  "

## 2021-01-08 NOTE — PROGRESS NOTES
"  Assessment & Plan   Problem List Items Addressed This Visit        Urinary    Calculus of kidney with calculus of ureter - Primary    Relevant Orders    Stone analysis    Basic Metabolic Panel         He had 2 smaller ones too.  For now, will repeat the BMP as his Cr was up slightly.  initial prevention for future stones is simply high water intake.  Follow up as needed                               BMI:   Estimated body mass index is 24.91 kg/m  as calculated from the following:    Height as of 12/31/20: 1.778 m (5' 10\").    Weight as of this encounter: 78.7 kg (173 lb 9.6 oz).             No follow-ups on file.    Sotero Quach MD  [facility]    Subjective     Ramo is a 30 year old who presents to clinic today for the following health issues     HPI   Follow up kidney stone.  He passed this 5 days ago, and filtered the urine so has the stone with him now.  Currently no pain, fevers, n/v.  Says he had not been drinking much water.  Was seen in  on 12/31. Had a labs and a CT scan then showing:    Recent Results (from the past 720 hour(s))   UA reflex to Microscopic and Culture    Collection Time: 12/31/20  2:35 PM    Specimen: Midstream Urine   Result Value Ref Range    Color Urine Light Yellow     Appearance Urine Clear     Glucose Urine Negative NEG^Negative mg/dL    Bilirubin Urine Negative NEG^Negative    Ketones Urine Negative NEG^Negative mg/dL    Specific Gravity Urine 1.010 1.003 - 1.035    Blood Urine Moderate (A) NEG^Negative    pH Urine 6.0 5.0 - 7.0 pH    Protein Albumin Urine Negative NEG^Negative mg/dL    Urobilinogen mg/dL Normal 0.0 - 2.0 mg/dL    Nitrite Urine Negative NEG^Negative    Leukocyte Esterase Urine Negative NEG^Negative    Source Midstream Urine     RBC Urine 14 (H) 0 - 2 /HPF    WBC Urine 2 0 - 5 /HPF    Bacteria Urine Few (A) NEG^Negative /HPF    Mucous Urine Present (A) NEG^Negative /LPF   CBC and Differential    Collection Time: 12/31/20  2:59 PM   Result " Value Ref Range    WBC 12.2 (H) 4.0 - 11.0 10e9/L    RBC Count 5.08 4.4 - 5.9 10e12/L    Hemoglobin 14.8 13.3 - 17.7 g/dL    Hematocrit 40.9 40.0 - 53.0 %    MCV 81 78 - 100 fl    MCH 29.1 26.5 - 33.0 pg    MCHC 36.2 31.5 - 36.5 g/dL    RDW 12.2 10.0 - 15.0 %    Platelet Count 382 150 - 450 10e9/L    Diff Method Automated Method     % Neutrophils 70.0 %    % Lymphocytes 18.0 %    % Monocytes 10.7 %    % Eosinophils 0.6 %    % Basophils 0.4 %    % Immature Granulocytes 0.3 %    Absolute Neutrophil 8.5 (H) 1.6 - 8.3 10e9/L    Absolute Lymphocytes 2.2 0.8 - 5.3 10e9/L    Absolute Monocytes 1.3 0.0 - 1.3 10e9/L    Absolute Eosinophils 0.1 0.0 - 0.7 10e9/L    Absolute Basophils 0.1 0.0 - 0.2 10e9/L    Abs Immature Granulocytes 0.0 0 - 0.4 10e9/L   Basic Metabolic Panel    Collection Time: 12/31/20  2:59 PM   Result Value Ref Range    Sodium 138 134 - 144 mmol/L    Potassium 4.0 3.5 - 5.1 mmol/L    Chloride 102 98 - 107 mmol/L    Carbon Dioxide 30 21 - 31 mmol/L    Anion Gap 6 3 - 14 mmol/L    Glucose 110 (H) 70 - 105 mg/dL    Urea Nitrogen 14 7 - 25 mg/dL    Creatinine 1.47 (H) 0.70 - 1.30 mg/dL    GFR Estimate 57 (L) >60 mL/min/[1.73_m2]    GFR Estimate If Black 69 >60 mL/min/[1.73_m2]    Calcium 9.6 8.6 - 10.3 mg/dL   Urine Culture Aerobic Bacterial    Collection Time: 12/31/20  4:26 PM    Specimen: Midstream Urine   Result Value Ref Range    Specimen Description Midstream Urine     Culture Micro       Urine culture negative (no growth of uropathogens).     CT:      IMPRESSION: Proximal right ureteric calculus measuring approximately 3  mm in diameter.     ALISIA NELSON MD          Review of Systems         Objective    /62   Pulse 71   Temp 97.6  F (36.4  C)   Resp 16   Wt 78.7 kg (173 lb 9.6 oz)   SpO2 99%   BMI 24.91 kg/m    Body mass index is 24.91 kg/m .  Physical Exam  Constitutional:       Appearance: Normal appearance.   Abdominal:      General: Abdomen is flat. There is no distension.       Palpations: There is no mass.      Tenderness: There is no right CVA tenderness or left CVA tenderness.   Musculoskeletal:      Comments: No flank pain on percussion   Neurological:      General: No focal deficit present.      Mental Status: He is alert and oriented to person, place, and time.   Psychiatric:         Mood and Affect: Mood normal.         Behavior: Behavior normal.

## 2021-01-09 ASSESSMENT — ANXIETY QUESTIONNAIRES: GAD7 TOTAL SCORE: 0

## 2021-01-13 LAB
APPEARANCE STONE: NORMAL
COMPN STONE: NORMAL
NUMBER STONE: 1
SIZE STONE: NORMAL MM
WT STONE: 4 MG

## 2021-04-18 ENCOUNTER — HEALTH MAINTENANCE LETTER (OUTPATIENT)
Age: 31
End: 2021-04-18

## 2021-10-03 ENCOUNTER — HEALTH MAINTENANCE LETTER (OUTPATIENT)
Age: 31
End: 2021-10-03

## 2021-12-06 DIAGNOSIS — F41.1 GAD (GENERALIZED ANXIETY DISORDER): ICD-10-CM

## 2021-12-07 ENCOUNTER — TELEPHONE (OUTPATIENT)
Dept: FAMILY MEDICINE | Facility: OTHER | Age: 31
End: 2021-12-07
Payer: COMMERCIAL

## 2021-12-07 DIAGNOSIS — F41.1 GAD (GENERALIZED ANXIETY DISORDER): ICD-10-CM

## 2021-12-07 NOTE — TELEPHONE ENCOUNTER
Pt is not getting enough of his Zoloft, he needs to be taking 150mg, only taking 100mg, please help, [facility], please call thank you!  Pt will be out soon.    Hermelinda Ruiz on 12/7/2021 at 3:49 PM

## 2021-12-08 NOTE — TELEPHONE ENCOUNTER
The patient called and needs a refill of his medication.  He was only given 3 pills from the pharmacy, please advise.

## 2021-12-08 NOTE — TELEPHONE ENCOUNTER
Called patient and updated him that Dr. Quach will return tomorrow and address the refill then. He was ok with this plan. Shey Roblero RN on 12/8/2021 at 12:05 PM

## 2021-12-08 NOTE — TELEPHONE ENCOUNTER
[facility] in [id number] in [facility] [place] sent Rx request for the following:      Requested Prescriptions   Pending Prescriptions Disp Refills     sertraline (ZOLOFT) 50 MG tablet [Pharmacy Med Name: SERTRALINE HCL 50 MG TABLET] 30 tablet 11     Sig: TAKE 1 TABLET (50 MG) BY MOUTH DAILY TAKE ALONG WITH  MG TO MAKE 150 MG   Last Prescription Date:   12/8/20  Last Fill Qty/Refills:         90, R-3    Last Office Visit:              1/8/21   Future Office visit:           None    Unable to complete prescription refill per RN Medication Refill Policy. Melissa Ambriz RN .............. 12/8/2021  10:24 AM

## 2021-12-09 RX ORDER — SERTRALINE HYDROCHLORIDE 100 MG/1
100 TABLET, FILM COATED ORAL DAILY
Qty: 90 TABLET | Refills: 3 | Status: SHIPPED | OUTPATIENT
Start: 2021-12-09 | End: 2023-01-03

## 2021-12-31 NOTE — PROGRESS NOTES
Dear Doris,    Based on your exposure to COVID-19 (coronavirus), we would like to test you for this virus. I have placed an order for this test. The best time for testing is 5-7 days after the exposure.    How to schedule:  Go to your [employer] home page and scroll down to the section that says  You have an appointment that needs to be scheduled  and click the large green button that says  Schedule Now  and follow the steps to find the next available opening.     If you are unable to complete these [employer] scheduling steps, please call [phone] to schedule your testing.     Monoclonal antibody treatment after exposure:  Because you have been exposed to COVID-19, you may be able to receive a treatment with monoclonal antibodies. This treatment can lower your risk of severe illness and going to the hospital. It is given through an IV or under your skin (subcutaneous) and must be given at an infusion center.   To be eligible, you must be 12 years or older, at least 88 pounds and:    Are not fully vaccinated against COVID-19, OR    Are immunocompromised     If you would like to sign up to be considered to receive the monoclonal antibody medicine, please complete a participation form through the [facility] here:  MNRAP ([email or web address]). You may also call the [facility] COVID-19 Public Hotline at [phone] (open Mon-Fri: 9am-7pm and Sat: 10am-6pm).     Not all people who are eligible will receive the medicine since supply is limited. You will be contacted in the next 1 to 2 business days only if you are selected. If you do not receive a call, you have not been selected to receive the medicine. If you have any questions about this medication, please contact your primary care provider. For more information, see [email or web address]    Return to work/school/ guidance:   Please let your workplace manager and  HPI:    Ramo Freeman is a 30 year old male  who presents to [facility] today for back pain and hematuria.    Right lower back pain started yesterday and continues to persist.  Rates pain between 3/10 to 8-9/10.  States he moved a  yesterday and doesn't know if he could have injured his back.  States he noted blood in his urine along with some mild dysuria yesterday after moving the .   States he developed urinary frequency and urgency this morning.  No abdominal pain.  No groin pain.  No penile discharge.  No STD concerns.  No nausea or vomiting.  Normal appetite.  States he drinks about 2 cans of pop daily.  States he only drinks about 2 glasses of water daily.  No diarrhea or constipation but states increased bowel movements today, states 3 bowel movements today.  Normal stool color, no blood, dark or tarry stools.  No fevers or chills.  No lower extremity numbness, tingling, weakness or pain.    No hx of UTI or renal/ureteral stones.  States family hx of kidney stones - father.  Took Ibuprofen 600 mg about 2 hours ago without relief.  Iced his back earlier without relief.          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     Procedure and treatment not carried out because of patient's decision for reasons of belief and group pressure     No Comments Provided     Past Surgical History:   Procedure Laterality Date     OTHER SURGICAL HISTORY      06/18/99,[id number],MYRINGOTOMY,and PE tubes times two.     Social History     Tobacco Use     Smoking status: Never Smoker     Smokeless tobacco: Never Used   Substance Use Topics     Alcohol use: No     Current Outpatient Medications   Medication Sig Dispense Refill     fexofenadine (ALLEGRA) 60 MG tablet TAKE 1 TABLET (60 MG) BY MOUTH 2 TIMES DAILY 60 tablet 6     sertraline (ZOLOFT) 100 MG  "tablet Take 1 tablet (100 mg) by mouth daily 90 tablet 3     sertraline (ZOLOFT) 50 MG tablet Take 1 tablet (50 mg) by mouth daily Take along with the 100 mg to make 150 mg 90 tablet 3     VITAMIN D, CHOLECALCIFEROL, PO Take 4,000 Units by mouth daily       albuterol (PROAIR HFA/PROVENTIL HFA/VENTOLIN HFA) 108 (90 BASE) MCG/ACT Inhaler Inhale 2 puffs into the lungs every 4 hours as needed       FAMOTIDINE PO Take 20 mg by mouth daily as needed for heartburn       Allergies   Allergen Reactions     Nyquil [Nite Time] Nausea and Vomiting     hyper     Allgood Nite Time GI Disturbance     Causes hyperactivity and then emesis         Past medical history, past surgical history, current medications and allergies reviewed and accurate to the best of my knowledge.        ROS:  Refer to HPI    /64   Pulse 71   Temp 97.4  F (36.3  C) (Tympanic)   Resp 16   Ht 1.778 m (5' 10\")   Wt 82.3 kg (181 lb 8 oz)   SpO2 98%   BMI 26.04 kg/m      EXAM:  General Appearance: Well appearing adult male, non ill appearance, appropriate appearance for age. No acute distress  Respiratory: normal chest wall and respirations.  Normal effort.  Clear to auscultation bilaterally, no wheezing, crackles or rhonchi.  No increased work of breathing.  No cough appreciated.  Cardiac: RRR with no murmurs  Abdomen: soft, nontender, no rigidity, no rebound tenderness or guarding, normal bowel sounds present  :  No suprapubic tenderness to palpation.  Right flank/CVA tenderness to palpation.  Left flank without tenderness to palpation.    male:  Patient refuses genital exam  Musculoskeletal:  Equal movement of bilateral upper extremities.  Equal movement of bilateral lower extremities.  Normal gait.  Thoracic and lumbar spine without tenderness to palpation.  Right flank/paraspinal area tenderness to palpation.  Able to bend at waist without difficulty.  Normal bilateral straight leg raise.  Able to stand and walk on tip toes without increased " staffing office know when your quarantine ends. We cannot give you an exact date as it depends on the information below. You can calculate this on your own or work with your manager/staffing office to calculate this.    Quarantine Guidelines:  You are considered exposed if you have been within 6 feet of an infected person(s) for 15 minutes or more over a 24-hour period. Quarantine will start after the LAST time you had contact with the infected person while they were contagious (for example, if you saw someone on Monday and Wednesday, your quarantine would start after Wednesday).     If you have NO symptoms (asymptomatic):    Stay home for 14 days (quarantine) after your LAST contact with a person who has COVID-19 (this remains the CDC recommendation for greatest protection against spread of COVID-19), OR    10-day quarantine with no test, OR    Minimum 7-day quarantine with negative RT-PCR test collected on day 5 or later    Quarantine Guideline exceptions:    People who have been fully vaccinated do not need to quarantine unless they have symptoms. You are considered fully vaccinated 2 weeks after your 2nd dose in a 2-dose series or 2 weeks after a single-dose series. This includes vaccinated health care workers.  o Fully vaccinated people should still get tested 5-7 days after exposure, even if they don t have symptoms.   Note: If you have ongoing exposure to the COVID-positive person, this quarantine period may be more than 14 days. For example, if you continue to be exposed to your child who tested positive and cannot isolate from them, then the quarantine of 7-14 days can't start until your child is no longer contagious. This is typically 10 days from onset of the child's symptoms. So, the total duration may be 17-24 days in this case.   Please contact your doctor if you have questions or call the [facility] Public Hotline: [phone] (Mon-Fri: 9am-7pm and Sat: 10am-6pm).     How to Quarantine:     Monitor your  back pain.  Able to stand and walk on heels without increased back pain.    Dermatological: Back without rash  Psychological: normal affect, alert, oriented, and pleasant.       CT scan and Labs:  Results for orders placed or performed during the hospital encounter of 12/31/20   CT Urogram wo & w Contrast     Status: None    Narrative    PROCEDURE: CT UROGRAM WO & W CONTRAST 12/31/2020 3:46 PM    HISTORY: Hematuria, unknown cause; Flank pain, stone disease suspected  - right; Gross hematuria; Right flank pain    COMPARISONS: None.    Meds/Dose Given: 120 mL omnipaque 350    TECHNIQUE: CT scan of the abdomen and pelvis with and without IV  contrast sagittal and coronal reconstructions were obtained    FINDINGS: The lung bases are clear. The liver is free of masses or  biliary ductal enlargement. No calcified gallstones are seen. The  spleen and pancreas appear normal. The adrenal glands are normal.  There is a benign appearing cyst seen in the left kidney. There is no  hydronephrosis. There is a 3 mm in diameter calculus seen in the  proximal right ureter just beyond the ureteropelvic junction. There  are 2 small 2 to 3 mm in diameter calculi seen in the lower pole of  the left kidney.    The periaortic lymph nodes are normal in caliber.    No intraperitoneal masses or inflammatory changes are noted.    The bladder and rectum appear normal.    Regional skeleton is intact.         Impression    IMPRESSION: Proximal right ureteric calculus measuring approximately 3  mm in diameter.    ALISIA NELSON MD   Results for orders placed or performed in visit on 12/31/20   UA reflex to Microscopic and Culture     Status: Abnormal    Specimen: Midstream Urine   Result Value Ref Range    Color Urine Light Yellow     Appearance Urine Clear     Glucose Urine Negative NEG^Negative mg/dL    Bilirubin Urine Negative NEG^Negative    Ketones Urine Negative NEG^Negative mg/dL    Specific Gravity Urine 1.010 1.003 - 1.035    Blood  symptoms until 14 days after your last exposure. If you develop signs or symptoms, isolate and get tested (even if you have been tested already).    Stay home and away from others. Don't go to school or anywhere else. Generally, quarantine means staying home from work but there are some exceptions to this. Please contact your workplace. Cover your mouth and nose with a face covering if you must be around other people.     Wash your hands and face often. Use soap and water.    What are the symptoms of COVID-19?  The most common symptoms are cough, fever and trouble breathing. Less common symptoms include headache, body aches, fatigue (feeling very tired), chills, sore throat, stuffy or runny nose, diarrhea (loose poop), loss of taste or smell, belly pain, and nausea or vomiting (feeling sick to your stomach or throwing up).  If you develop symptoms, follow these guidelines:    If you're normally healthy: Please start another eVisit.    If you have a serious health problem (like cancer, heart failure, an organ transplant or kidney disease): Call your specialty clinic. Let them know that you might have COVID-19.    Where can I get more information?    [facility] - About COVID-19: [email or web address]     CDC - What to Do If You're Sick:     www.cdc.gov/coronavirus/2019-ncov/about/steps-when-sick.html    CDC - Ending Home Isolation:  https://www.cdc.gov/coronavirus/2019-ncov/your-health/quarantine-isolation.html    CDC - Caring for Someone:  www.cdc.gov/coronavirus/2019-ncov/if-you-are-sick/care-for-someone.html    [facility] clinical trials (COVID-19 research studies): [email or web address]-trials    Below are the COVID-19 hotlines at the [facility] ([facility]). Interpreters are available.  o For health questions: Call 803-776-2592 or 1-648.646.5167 (7 am to 7 pm)  o For questions about schools and childcare: Call 483-368-2578 or 1-442.101.3814 (7 a.m. to 7  "p.m.)    December 31, 2021  RE:  Doris Mitchell                                                                                                                   [address] [place] [address]      To whom it may concern:    I evaluated Doris Mitchell on December 31, 2021. Doris Mitchell should be excused from work/school.    They should let their workplace manager and staffing office know when their quarantine ends.    We can not give an exact date as it depends on the information below. They can calculate this on their own or work with their manager/staffing office to calculate this. (For example if they were exposed on 10/04, they would have to quarantine for 14 full days. That would be through 10/18. They could return on 10/19.)    Quarantine Guidelines:    Patients (\"contacts\") who have been in close prolonged contact of an infected person(s) (within six feet for at least 15 minutes within a 24 hour period) and remain asymptomatic should enter quarantine based on the following options:      14-day quarantine period (this remains the CDC recommendation for the greatest protection against spread of COVID-19) OR    Minimum 7-day quarantine with negative RT-PCR test collected on day 5 or later OR    10-day quarantine with no test   Quarantine Guideline exceptions are as follows:    People who have been fully vaccinated do not need to quarantine if the exposure was at least 2 weeks after the last vaccination. This includes vaccinated health care workers.    Not fully vaccinated and unvaccinated Individuals who work in health care, congregate care, or congregate living should be off work for 14 days from their last date of exposure. Community activities for this group can be resumed based on options above. Fully vaccinated individuals in this group do not need to quarantine from work after exposure.    Not fully vaccinated and unvaccinated people whose high-risk exposure was a household member should " Urine Moderate (A) NEG^Negative    pH Urine 6.0 5.0 - 7.0 pH    Protein Albumin Urine Negative NEG^Negative mg/dL    Urobilinogen mg/dL Normal 0.0 - 2.0 mg/dL    Nitrite Urine Negative NEG^Negative    Leukocyte Esterase Urine Negative NEG^Negative    Source Midstream Urine     RBC Urine 14 (H) 0 - 2 /HPF    WBC Urine 2 0 - 5 /HPF    Bacteria Urine Few (A) NEG^Negative /HPF    Mucous Urine Present (A) NEG^Negative /LPF   CBC and Differential     Status: Abnormal   Result Value Ref Range    WBC 12.2 (H) 4.0 - 11.0 10e9/L    RBC Count 5.08 4.4 - 5.9 10e12/L    Hemoglobin 14.8 13.3 - 17.7 g/dL    Hematocrit 40.9 40.0 - 53.0 %    MCV 81 78 - 100 fl    MCH 29.1 26.5 - 33.0 pg    MCHC 36.2 31.5 - 36.5 g/dL    RDW 12.2 10.0 - 15.0 %    Platelet Count 382 150 - 450 10e9/L    Diff Method Automated Method     % Neutrophils 70.0 %    % Lymphocytes 18.0 %    % Monocytes 10.7 %    % Eosinophils 0.6 %    % Basophils 0.4 %    % Immature Granulocytes 0.3 %    Absolute Neutrophil 8.5 (H) 1.6 - 8.3 10e9/L    Absolute Lymphocytes 2.2 0.8 - 5.3 10e9/L    Absolute Monocytes 1.3 0.0 - 1.3 10e9/L    Absolute Eosinophils 0.1 0.0 - 0.7 10e9/L    Absolute Basophils 0.1 0.0 - 0.2 10e9/L    Abs Immature Granulocytes 0.0 0 - 0.4 10e9/L   Basic Metabolic Panel     Status: Abnormal   Result Value Ref Range    Sodium 138 134 - 144 mmol/L    Potassium 4.0 3.5 - 5.1 mmol/L    Chloride 102 98 - 107 mmol/L    Carbon Dioxide 30 21 - 31 mmol/L    Anion Gap 6 3 - 14 mmol/L    Glucose 110 (H) 70 - 105 mg/dL    Urea Nitrogen 14 7 - 25 mg/dL    Creatinine 1.47 (H) 0.70 - 1.30 mg/dL    GFR Estimate 57 (L) >60 mL/min/[1.73_m2]    GFR Estimate If Black 69 >60 mL/min/[1.73_m2]    Calcium 9.6 8.6 - 10.3 mg/dL                 ASSESSMENT/PLAN:    I have reviewed the nursing notes.  I have reviewed the findings, diagnosis, plan and need for follow up with the patient.    1. Gross hematuria    - UA reflex to Microscopic and Culture  - Urine Culture Aerobic  Bacterial  - CBC and Differential; Future  - CBC and Differential  - Basic Metabolic Panel; Future  - Basic Metabolic Panel      2. Right flank pain    - UA reflex to Microscopic and Culture  - CBC and Differential; Future  - CBC and Differential  - Basic Metabolic Panel; Future  - Basic Metabolic Panel    - acetaminophen-codeine (TYLENOL #3) 300-30 MG tablet; Take 1 tablet by mouth every 6 hours as needed for severe pain  Dispense: 10 tablet; Refill: 0    3. Calculus of proximal right ureter    - acetaminophen-codeine (TYLENOL #3) 300-30 MG tablet; Take 1 tablet by mouth every 6 hours as needed for severe pain  Dispense: 10 tablet; Refill: 0    - sulfamethoxazole-trimethoprim (BACTRIM DS) 800-160 MG tablet; Take 1 tablet by mouth 2 times daily for 3 days  Dispense: 6 tablet; Refill: 0    Urinalysis - light clear yellow, moderate blood, negative nitrite, negative leukocyte estrace  Urine microscopic - RBC 14, WBC 2, bacteria few    CBC with mildly elevated WBC of 12.2  BMP with mildly elevated creatinine of 1.47 and mildly decreased GFR at 57    CT scan:  There is a benign appearing cyst seen in the left kidney. There is no hydronephrosis. There is a 3 mm in diameter calculus seen in the proximal right ureter just beyond the ureteropelvic junction. Thereare 2 small 2 to 3 mm in diameter calculi seen in the lower pole of the left kidney.      Urine culture pending  Will call if culture warrants change of abx.     May use Pyridium OTC PRN.     Encouraged fluids and frequent bladder emptying.  Encouraged increased intake of water daily, goal of at least 6 to 8 glasses daily.    Patient provided with urine strainer and instructed to strain all of his urine.  IF stone passed and found, bring to f/u appt.    Discussed warning signs/symptoms indicative of need to f/u  Follow up if symptoms persist or worsen or concerns    Follow up with PCP for recheck, appointment scheduled for 1/8/21.        I explained my diagnostic  always quarantine for 14 days from their last date of exposure. Fully vaccinated people in this category do not need to quarantine.    Not fully vaccinated or unvaccinated residents of congregate care and congregate living settings should always quarantine for 14 days from their last date of exposure. Fully vaccinated residents do not need to quarantine.    Note: If there is ongoing exposure to the covid positive person, this quarantine period may be longer than 14 days. (For example, if they are continually exposed to their child, who tested positive and cannot isolate from them, then the quarantine of 7-14 days can't start until their child is no longer contagious. This is typically 10 days from onset to the child's symptoms. So the total duration may be 17-24 days in this case.)    Doris Gonzalezyordysoledad should continue symptom monitoring until day 14 post-exposure. If they develop signs or symptoms of COVID-19, they should isolate and get tested (even if they have been tested already).    Sincerely,  Génesis Kaiser, CNP   considerations and recommendations to the patient, who voiced understanding and agreement with the treatment plan. All questions were answered. We discussed potential side effects of any prescribed or recommended therapies, as well as expectations for response to treatments.    Disclaimer:  This note consists of words and symbols derived from keyboarding, dictation, or using voice recognition software. As a result, there may be errors in the script that have gone undetected. Please consider this when interpreting information found in this note.

## 2022-05-15 ENCOUNTER — HEALTH MAINTENANCE LETTER (OUTPATIENT)
Age: 32
End: 2022-05-15

## 2022-09-04 ENCOUNTER — HEALTH MAINTENANCE LETTER (OUTPATIENT)
Age: 32
End: 2022-09-04

## 2022-12-31 DIAGNOSIS — F41.1 GAD (GENERALIZED ANXIETY DISORDER): ICD-10-CM

## 2023-01-02 NOTE — TELEPHONE ENCOUNTER
[facility] sent Rx request for the following:      SERTRALINE HCL 50 MG TABLET    Last Prescription Date:   12/9/21  Last Fill Qty/Refills:         90, R-3    Last Office Visit:              1/8/21   Future Office visit:           1/20/23    SERTRALINE  MG TABLET    Last Prescription Date:   12/9/21  Last Fill Qty/Refills:         90, R-3      Unable to complete prescription refill per RN Medication Refill Policy.     Camelia Duncan RN .............. 1/2/2023  3:08 PM

## 2023-01-03 DIAGNOSIS — F41.1 GAD (GENERALIZED ANXIETY DISORDER): ICD-10-CM

## 2023-01-03 RX ORDER — SERTRALINE HYDROCHLORIDE 100 MG/1
100 TABLET, FILM COATED ORAL DAILY
Qty: 90 TABLET | Refills: 0 | Status: SHIPPED | OUTPATIENT
Start: 2023-01-03 | End: 2024-01-22

## 2023-01-03 RX ORDER — SERTRALINE HYDROCHLORIDE 100 MG/1
TABLET, FILM COATED ORAL
Qty: 30 TABLET | Refills: 0 | Status: SHIPPED | OUTPATIENT
Start: 2023-01-03 | End: 2023-01-20

## 2023-01-03 NOTE — TELEPHONE ENCOUNTER
Requested Prescriptions   Pending Prescriptions Disp Refills     sertraline (ZOLOFT) 100 MG tablet 90 tablet 3     Sig: Take 1 tablet (100 mg) by mouth daily   Last Prescription Date:   12/9/21  Last Fill Qty/Refills:         90, R-3         sertraline (ZOLOFT) 50 MG tablet 90 tablet 0     Sig: Take 1 tablet (50 mg) by mouth daily Take along with the 100 mg to make 150 mg   Last Prescription Date:   12/9/21  Last Fill Qty/Refills:         90, R-3      Last Office Visit:              1/8/21  Future Office visit:             Next 5 appointments (look out 90 days)    Jan 20, 2023  3:20 PM  PHYSICAL with Sotero Quach MD  [facility] ([facility] ) [address]  [place] [address]  [phone]        In clinical absence of patient's primary, Sotero Quach, patient is requesting that this message be sent to the covering provider for consideration please.    Melissa Ambriz RN .............. 1/3/2023  10:33 AM

## 2023-01-03 NOTE — TELEPHONE ENCOUNTER
Reason for call: Medication or medication refill    Name of medication requested: sertraline 100 and 50 mg    Are you out of the medication? yes    What pharmacy do you use? [facility]    Preferred method for responding to this message: Telephone Call    Phone number patient can be reached at: Cell number on file:    Telephone Information:   Mobile [phone]       If we cannot reach you directly, may we leave a detailed response at the number you provided? Yes     Patient called and requested a call back from [facility] nurse regarding his anxiety medication refill.    Marah Uriostegui on 1/3/2023 at 10:27 AM

## 2023-01-20 ENCOUNTER — OFFICE VISIT (OUTPATIENT)
Dept: FAMILY MEDICINE | Facility: OTHER | Age: 33
End: 2023-01-20
Attending: FAMILY MEDICINE
Payer: COMMERCIAL

## 2023-01-20 VITALS
HEART RATE: 68 BPM | WEIGHT: 184.2 LBS | DIASTOLIC BLOOD PRESSURE: 72 MMHG | OXYGEN SATURATION: 98 % | HEIGHT: 68 IN | BODY MASS INDEX: 27.92 KG/M2 | SYSTOLIC BLOOD PRESSURE: 124 MMHG | RESPIRATION RATE: 15 BRPM | TEMPERATURE: 97.2 F

## 2023-01-20 DIAGNOSIS — Z00.00 ROUTINE GENERAL MEDICAL EXAMINATION AT A HEALTH CARE FACILITY: Primary | ICD-10-CM

## 2023-01-20 DIAGNOSIS — Z11.59 NEED FOR HEPATITIS C SCREENING TEST: ICD-10-CM

## 2023-01-20 DIAGNOSIS — F41.1 GAD (GENERALIZED ANXIETY DISORDER): ICD-10-CM

## 2023-01-20 PROCEDURE — 90471 IMMUNIZATION ADMIN: CPT

## 2023-01-20 PROCEDURE — 99395 PREV VISIT EST AGE 18-39: CPT | Performed by: FAMILY MEDICINE

## 2023-01-20 RX ORDER — SERTRALINE HYDROCHLORIDE 100 MG/1
100 TABLET, FILM COATED ORAL DAILY
Qty: 90 TABLET | Refills: 4 | Status: SHIPPED | OUTPATIENT
Start: 2023-01-20 | End: 2024-01-22

## 2023-01-20 ASSESSMENT — ENCOUNTER SYMPTOMS: COUGH: 1

## 2023-01-20 ASSESSMENT — ANXIETY QUESTIONNAIRES
6. BECOMING EASILY ANNOYED OR IRRITABLE: NOT AT ALL
GAD7 TOTAL SCORE: 0
5. BEING SO RESTLESS THAT IT IS HARD TO SIT STILL: NOT AT ALL
3. WORRYING TOO MUCH ABOUT DIFFERENT THINGS: NOT AT ALL
1. FEELING NERVOUS, ANXIOUS, OR ON EDGE: NOT AT ALL
IF YOU CHECKED OFF ANY PROBLEMS ON THIS QUESTIONNAIRE, HOW DIFFICULT HAVE THESE PROBLEMS MADE IT FOR YOU TO DO YOUR WORK, TAKE CARE OF THINGS AT HOME, OR GET ALONG WITH OTHER PEOPLE: NOT DIFFICULT AT ALL
7. FEELING AFRAID AS IF SOMETHING AWFUL MIGHT HAPPEN: NOT AT ALL
GAD7 TOTAL SCORE: 0
2. NOT BEING ABLE TO STOP OR CONTROL WORRYING: NOT AT ALL

## 2023-01-20 ASSESSMENT — PAIN SCALES - GENERAL: PAINLEVEL: NO PAIN (0)

## 2023-01-20 ASSESSMENT — PATIENT HEALTH QUESTIONNAIRE - PHQ9
5. POOR APPETITE OR OVEREATING: NOT AT ALL
SUM OF ALL RESPONSES TO PHQ QUESTIONS 1-9: 0

## 2023-01-20 NOTE — PROGRESS NOTES
SUBJECTIVE:   CC: Ramo is an 32 year old who presents for preventative health visit.     Patient has been advised of split billing requirements and indicates understanding: Yes  Healthy Habits:     Getting at least 3 servings of Calcium per day:  Yes    Bi-annual eye exam:  Yes    Dental care twice a year:  Yes    Sleep apnea or symptoms of sleep apnea:  None    Diet:  Regular (no restrictions)    Duration of exercise:  Less than 15 minutes    Taking medications regularly:  Yes    Medication side effects:  None    PHQ-2 Total Score: 0    Additional concerns today:  No              Today's PHQ-2 Score:   PHQ-2 ( 1999 Pfizer) 1/20/2023   Q1: Little interest or pleasure in doing things 0   Q2: Feeling down, depressed or hopeless 0   PHQ-2 Score 0   PHQ-2 Total Score (12-17 Years)- Positive if 3 or more points; Administer PHQ-A if positive -   Q1: Little interest or pleasure in doing things Not at all   Q2: Feeling down, depressed or hopeless Not at all   PHQ-2 Score 0           Social History     Tobacco Use     Smoking status: Never     Smokeless tobacco: Never   Substance Use Topics     Alcohol use: No     If you drink alcohol do you typically have >3 drinks per day or >7 drinks per week? No    Alcohol Use 1/20/2023   Prescreen: >3 drinks/day or >7 drinks/week? No   Prescreen: >3 drinks/day or >7 drinks/week? -       Last PSA: No results found for: PSA    Reviewed orders with patient. Reviewed health maintenance and updated orders accordingly - Yes  Labs reviewed in [facility]  Current Outpatient Medications   Medication Sig Dispense Refill     albuterol (PROAIR HFA/PROVENTIL HFA/VENTOLIN HFA) 108 (90 BASE) MCG/ACT Inhaler Inhale 2 puffs into the lungs every 4 hours as needed       FAMOTIDINE PO Take 20 mg by mouth daily as needed for heartburn       fexofenadine (ALLEGRA) 60 MG tablet TAKE 1 TABLET (60 MG) BY MOUTH 2 TIMES DAILY 60 tablet 6     sertraline (ZOLOFT) 100 MG tablet Take 1 tablet (100 mg) by mouth daily 90  "tablet 4     sertraline (ZOLOFT) 100 MG tablet Take 1 tablet (100 mg) by mouth daily 90 tablet 0     sertraline (ZOLOFT) 50 MG tablet Take 1 tablet (50 mg) by mouth daily Take along with the 100 mg to make 150 mg 90 tablet 4     sertraline (ZOLOFT) 50 MG tablet Take 1 tablet (50 mg) by mouth daily Take along with the 100 mg to make 150 mg 90 tablet 0     VITAMIN D, CHOLECALCIFEROL, PO Take 4,000 Units by mouth daily       Allergies   Allergen Reactions     Nyquil [Nite Time] Nausea and Vomiting     hyper     [place] Nite Time GI Disturbance     Causes hyperactivity and then emesis       Reviewed and updated as needed this visit by clinical staff   Tobacco  Allergies  Meds   Med Hx  Surg Hx  Fam Hx  Soc Hx        Reviewed and updated as needed this visit by Provider                 Past Medical History:   Diagnosis Date     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     Procedure and treatment not carried out because of patient's decision for reasons of belief and group pressure     No Comments Provided      Past Surgical History:   Procedure Laterality Date     OTHER SURGICAL HISTORY      06/18/99,[id number],MYRINGOTOMY,and PE tubes times two.       Review of Systems   HENT: Positive for congestion.    Respiratory: Positive for cough.    Genitourinary: Negative for impotence and penile discharge.     Was just  in December.  Recent URI, nearly resolved.        OBJECTIVE:   /72   Pulse 68   Temp 97.2  F (36.2  C) (Tympanic)   Resp 15   Ht 1.721 m (5' 7.75\")   Wt 83.6 kg (184 lb 3.2 oz)   SpO2 98%   BMI 28.21 kg/m      Physical Exam  GENERAL: healthy, alert and no distress  EYES: Eyes grossly normal to inspection, PERRL and conjunctivae and sclerae normal  HENT: ear canals and TM's normal, nose and mouth without ulcers or lesions  NECK: no adenopathy, no asymmetry, masses, or scars and " "thyroid normal to palpation  RESP: lungs clear to auscultation - no rales, rhonchi or wheezes  CV: regular rate and rhythm, normal S1 S2, no S3 or S4, no murmur, click or rub, no peripheral edema and peripheral pulses strong  ABDOMEN: soft, nontender, no hepatosplenomegaly, no masses and bowel sounds normal  MS: no gross musculoskeletal defects noted, no edema  SKIN: no suspicious lesions or rashes  NEURO: Normal strength and tone, mentation intact and speech normal  PSYCH: mentation appears normal, affect normal/bright    Diagnostic Test Results:  Labs reviewed in Epic    ASSESSMENT/PLAN:       ICD-10-CM    1. Routine general medical examination at a health care facility  Z00.00       2. Need for hepatitis C screening test  Z11.59 Hepatitis C Screen Reflex to HCV RNA Quant and Genotype      3. BLAISE (generalized anxiety disorder)  F41.1 sertraline (ZOLOFT) 100 MG tablet     sertraline (ZOLOFT) 50 MG tablet                COUNSELING:   Reviewed preventive health counseling, as reflected in patient instructions       Regular exercise       Healthy diet/nutrition       Family planning      BMI:   Estimated body mass index is 28.21 kg/m  as calculated from the following:    Height as of this encounter: 1.721 m (5' 7.75\").    Weight as of this encounter: 83.6 kg (184 lb 3.2 oz).   Weight management plan: Discussed healthy diet and exercise guidelines      He reports that he has never smoked. He has never used smokeless tobacco.            Sotero Quach MD  [facility]  "

## 2023-01-20 NOTE — NURSING NOTE
"Chief Complaint   Patient presents with     Physical       Initial /72   Pulse 68   Temp 97.2  F (36.2  C) (Tympanic)   Resp 15   Ht 1.721 m (5' 7.75\")   Wt 83.6 kg (184 lb 3.2 oz)   SpO2 98%   BMI 28.21 kg/m   Estimated body mass index is 28.21 kg/m  as calculated from the following:    Height as of this encounter: 1.721 m (5' 7.75\").    Weight as of this encounter: 83.6 kg (184 lb 3.2 oz).  Medication Reconciliation: complete    FOOD SECURITY SCREENING QUESTIONS  Hunger Vital Signs:  Within the past 12 months we worried whether our food would run out before we got money to buy more. Never  Within the past 12 months the food we bought just didn't last and we didn't have money to get more. Never  Erlinda Carranza LPN 1/20/2023 3:30 PM        "

## 2023-04-13 ENCOUNTER — DOCUMENTATION ONLY (OUTPATIENT)
Dept: OTHER | Facility: CLINIC | Age: 33
End: 2023-04-13
Payer: COMMERCIAL

## 2023-09-14 ENCOUNTER — TELEPHONE (OUTPATIENT)
Dept: FAMILY MEDICINE | Facility: OTHER | Age: 33
End: 2023-09-14
Payer: COMMERCIAL

## 2023-09-14 NOTE — TELEPHONE ENCOUNTER
Attempted to reach patient unable to no VM set up at the phone number provided. Will try again.  Erlinda Carranza LPN

## 2023-09-14 NOTE — TELEPHONE ENCOUNTER
The patient's wife called and stated it has been a while since Ramo had his blood work and would like to have blood work done.  Please advise

## 2023-09-21 NOTE — TELEPHONE ENCOUNTER
No answer.  Message states: Not available, mailbox is full.  Cameron Carreon .......  9/21/2023  8:37 AM

## 2023-09-22 NOTE — TELEPHONE ENCOUNTER
No answer.  Detailed message along with our phone number left requesting a return call.  I did state to ask for a [address] nurse regarding a phone message.    Cameron Carreon .......  9/22/2023  8:06 AM

## 2023-11-19 ENCOUNTER — MYC MEDICAL ADVICE (OUTPATIENT)
Dept: FAMILY MEDICINE | Facility: OTHER | Age: 33
End: 2023-11-19
Payer: COMMERCIAL

## 2024-01-21 ASSESSMENT — ENCOUNTER SYMPTOMS
PARESTHESIAS: 0
DYSURIA: 0
DIZZINESS: 0
ABDOMINAL PAIN: 0
JOINT SWELLING: 0
WEAKNESS: 0
HEARTBURN: 1
SORE THROAT: 0
ARTHRALGIAS: 0
NERVOUS/ANXIOUS: 0
HEMATOCHEZIA: 0
CHILLS: 0
FEVER: 0
EYE PAIN: 0
COUGH: 0
NAUSEA: 0
CONSTIPATION: 0
DIARRHEA: 0
SHORTNESS OF BREATH: 0
MYALGIAS: 0
FREQUENCY: 0
PALPITATIONS: 0
HEADACHES: 0
HEMATURIA: 0

## 2024-01-22 ENCOUNTER — OFFICE VISIT (OUTPATIENT)
Dept: FAMILY MEDICINE | Facility: OTHER | Age: 34
End: 2024-01-22
Attending: FAMILY MEDICINE
Payer: COMMERCIAL

## 2024-01-22 VITALS
RESPIRATION RATE: 20 BRPM | HEART RATE: 79 BPM | DIASTOLIC BLOOD PRESSURE: 82 MMHG | OXYGEN SATURATION: 93 % | BODY MASS INDEX: 26.72 KG/M2 | TEMPERATURE: 97.1 F | WEIGHT: 180.4 LBS | HEIGHT: 69 IN | SYSTOLIC BLOOD PRESSURE: 130 MMHG

## 2024-01-22 DIAGNOSIS — K21.00 GASTROESOPHAGEAL REFLUX DISEASE WITH ESOPHAGITIS WITHOUT HEMORRHAGE: ICD-10-CM

## 2024-01-22 DIAGNOSIS — J11.1 INFLUENZA-LIKE ILLNESS: ICD-10-CM

## 2024-01-22 DIAGNOSIS — F41.1 GAD (GENERALIZED ANXIETY DISORDER): ICD-10-CM

## 2024-01-22 DIAGNOSIS — Z00.00 ROUTINE GENERAL MEDICAL EXAMINATION AT A HEALTH CARE FACILITY: Primary | ICD-10-CM

## 2024-01-22 LAB
ANION GAP SERPL CALCULATED.3IONS-SCNC: 9 MMOL/L (ref 7–15)
BUN SERPL-MCNC: 13.7 MG/DL (ref 6–20)
CALCIUM SERPL-MCNC: 10.4 MG/DL (ref 8.6–10)
CHLORIDE SERPL-SCNC: 104 MMOL/L (ref 98–107)
CREAT SERPL-MCNC: 0.89 MG/DL (ref 0.67–1.17)
DEPRECATED HCO3 PLAS-SCNC: 29 MMOL/L (ref 22–29)
EGFRCR SERPLBLD CKD-EPI 2021: >90 ML/MIN/1.73M2
GLUCOSE SERPL-MCNC: 98 MG/DL (ref 70–99)
HOLD SPECIMEN: NORMAL
POTASSIUM SERPL-SCNC: 4.4 MMOL/L (ref 3.4–5.3)
SODIUM SERPL-SCNC: 142 MMOL/L (ref 135–145)

## 2024-01-22 PROCEDURE — G0463 HOSPITAL OUTPT CLINIC VISIT: HCPCS

## 2024-01-22 PROCEDURE — 80048 BASIC METABOLIC PNL TOTAL CA: CPT | Mod: ZL | Performed by: FAMILY MEDICINE

## 2024-01-22 PROCEDURE — 99395 PREV VISIT EST AGE 18-39: CPT | Performed by: FAMILY MEDICINE

## 2024-01-22 PROCEDURE — 36415 COLL VENOUS BLD VENIPUNCTURE: CPT | Mod: ZL | Performed by: FAMILY MEDICINE

## 2024-01-22 RX ORDER — PREDNISONE 20 MG/1
20 TABLET ORAL 2 TIMES DAILY
Qty: 10 TABLET | Refills: 0 | Status: SHIPPED | OUTPATIENT
Start: 2024-01-22 | End: 2024-01-27

## 2024-01-22 ASSESSMENT — ENCOUNTER SYMPTOMS
DYSURIA: 0
NERVOUS/ANXIOUS: 0
CHILLS: 0
SHORTNESS OF BREATH: 0
FREQUENCY: 0
EYE PAIN: 0
DIARRHEA: 0
SORE THROAT: 0
HEADACHES: 0
PALPITATIONS: 0
HEMATURIA: 0
ABDOMINAL PAIN: 0
DIZZINESS: 0
NAUSEA: 0
MYALGIAS: 0
CONSTIPATION: 0
FEVER: 0
COUGH: 0
JOINT SWELLING: 0
WEAKNESS: 0
ARTHRALGIAS: 0

## 2024-01-22 ASSESSMENT — PAIN SCALES - GENERAL: PAINLEVEL: NO PAIN (0)

## 2024-01-22 NOTE — PROGRESS NOTES
"Preventive Care Visit  [facility]  Sotero Quach MD, Family Medicine  Jan 22, 2024      SUBJECTIVE:   Ramo is a 33 year old, presenting for the following:  Physical, Heartburn, and Nasal Congestion      Healthy Habits:     Getting at least 3 servings of Calcium per day:  NO    Bi-annual eye exam:  Yes    Dental care twice a year:  Yes    Sleep apnea or symptoms of sleep apnea:  Daytime drowsiness    Diet:  Regular (no restrictions)    Frequency of exercise:  None    Taking medications regularly:  Yes    Barriers to taking medications:  Side effects    Medication side effects:  None    Additional concerns today:  Yes      Today's PHQ-2 Score:       1/21/2024     3:20 PM   PHQ-2 ( 1999 Pfizer)   Q1: Little interest or pleasure in doing things 0   Q2: Feeling down, depressed or hopeless 0   PHQ-2 Score 0   Q1: Little interest or pleasure in doing things Not at all   Q2: Feeling down, depressed or hopeless Not at all   PHQ-2 Score 0       Answers submitted by the patient for this visit:  Annual Preventive Visit (Submitted on 1/21/2024)  Chief Complaint: Annual Exam:  Blood in stool: No  heartburn: Yes  peripheral edema: No  mood changes: No  Skin sensation changes: No  impotence: Yes                  Social History     Tobacco Use    Smoking status: Never    Smokeless tobacco: Never   Substance Use Topics    Alcohol use: No           1/21/2024     2:36 PM   Alcohol Use   Prescreen: >3 drinks/day or >7 drinks/week? No       Last PSA: No results found for: \"PSA\"    Reviewed orders with patient. Reviewed health maintenance and updated orders accordingly - Yes  Current Outpatient Medications   Medication Sig Dispense Refill    albuterol (PROAIR HFA/PROVENTIL HFA/VENTOLIN HFA) 108 (90 BASE) MCG/ACT Inhaler Inhale 2 puffs into the lungs every 4 hours as needed      fexofenadine (ALLEGRA) 60 MG tablet TAKE 1 TABLET (60 MG) BY MOUTH 2 TIMES DAILY 60 tablet 6    predniSONE (DELTASONE) 20 MG tablet Take 1 " tablet (20 mg) by mouth 2 times daily for 5 days 10 tablet 0    sertraline (ZOLOFT) 50 MG tablet Take 1 tablet (50 mg) by mouth daily 90 tablet 4    VITAMIN D, CHOLECALCIFEROL, PO Take 4,000 Units by mouth daily      FAMOTIDINE PO Take 20 mg by mouth daily as needed for heartburn (Patient not taking: Reported on 1/22/2024)       Allergies   Allergen Reactions    Nyquil [Nite Time] Nausea and Vomiting     hyper    [place] Nite Time GI Disturbance     Causes hyperactivity and then emesis       Reviewed and updated as needed this visit by clinical staff   Tobacco  Allergies  Meds   Med Hx  Surg Hx  Fam Hx  Soc Hx        Reviewed and updated as needed this visit by Provider                  Past Medical History:   Diagnosis Date    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    Procedure and treatment not carried out because of patient's decision for reasons of belief and group pressure     No Comments Provided      Past Surgical History:   Procedure Laterality Date    OTHER SURGICAL HISTORY      06/18/99,[id number],MYRINGOTOMY,and PE tubes times two.     Review of Systems   Constitutional:  Negative for chills and fever.   HENT:  Negative for congestion, ear pain, hearing loss and sore throat.    Eyes:  Negative for pain and visual disturbance.   Respiratory:  Negative for cough and shortness of breath.    Cardiovascular:  Negative for chest pain and palpitations.   Gastrointestinal:  Negative for abdominal pain, constipation, diarrhea and nausea.   Genitourinary:  Negative for dysuria, frequency, genital sores, hematuria and urgency.   Musculoskeletal:  Negative for arthralgias, joint swelling and myalgias.   Skin:  Negative for rash.   Neurological:  Negative for dizziness, weakness and headaches.   Psychiatric/Behavioral:  The patient is not nervous/anxious.      Was exposed to influenza a few days ago, in  [place]. Returned home yesterday. Has had some cough, felt feverish without katherine fever. Symptoms for him for about 10 days. Has albuterol and has been using this perhaps 4-5 times a year. Has never had steroids, but wants to get something today.     Continues to get \"pretty intense\" gerd symptoms. At times tammy and TUMS work, but sometimes not enough. Will feel katherine gastric contents into his upper pharynx. Used omeprazole OTC, for 2 weeks and all symptoms resolved. Has also adjusted his eating. This has not really changed the symptoms.     Has cut back on zoloft, to 100 milligram. Was not feeling emotions on the high dose, and had trouble with erections on it. Is , but not intending to have children.        OBJECTIVE:   /82 (BP Location: Right arm, Patient Position: Sitting, Cuff Size: Adult Regular)   Pulse 79   Temp 97.1  F (36.2  C) (Temporal)   Resp 20   Ht 1.74 m (5' 8.5\")   Wt 81.8 kg (180 lb 6.4 oz)   SpO2 93%   BMI 27.03 kg/m     Estimated body mass index is 27.03 kg/m  as calculated from the following:    Height as of this encounter: 1.74 m (5' 8.5\").    Weight as of this encounter: 81.8 kg (180 lb 6.4 oz).  Physical Exam  GENERAL: alert and no distress  EYES: Eyes grossly normal to inspection, PERRL and conjunctivae and sclerae normal  HENT: ear canals and TM's normal, nose and mouth without ulcers or lesions  NECK: no adenopathy, no asymmetry, masses, or scars  RESP: lungs clear to auscultation - no rales, rhonchi or wheezes  CV: regular rate and rhythm, normal S1 S2, no S3 or S4, no murmur, click or rub, no peripheral edema  ABDOMEN: soft, nontender, no hepatosplenomegaly, no masses and bowel sounds normal  MS: no gross musculoskeletal defects noted, no edema  SKIN: no suspicious lesions or rashes  NEURO: Normal strength and tone, mentation intact and speech normal  PSYCH: mentation appears normal, affect normal/bright  LYMPH: no cervical, supraclavicular, axillary, or inguinal " adenopathy    Diagnostic Test Results:  Labs reviewed in Epic  Results for orders placed or performed in visit on 01/22/24   Basic Metabolic Panel     Status: Abnormal   Result Value Ref Range    Sodium 142 135 - 145 mmol/L    Potassium 4.4 3.4 - 5.3 mmol/L    Chloride 104 98 - 107 mmol/L    Carbon Dioxide (CO2) 29 22 - 29 mmol/L    Anion Gap 9 7 - 15 mmol/L    Urea Nitrogen 13.7 6.0 - 20.0 mg/dL    Creatinine 0.89 0.67 - 1.17 mg/dL    GFR Estimate >90 >60 mL/min/1.73m2    Calcium 10.4 (H) 8.6 - 10.0 mg/dL    Glucose 98 70 - 99 mg/dL   Extra Tube     Status: None    Narrative    The following orders were created for panel order Extra Tube.  Procedure                               Abnormality         Status                     ---------                               -----------         ------                     Extra Serum Separator Tu...[[id number]]                      Final result                 Please view results for these tests on the individual orders.   Extra Serum Separator Tube (SST)     Status: None   Result Value Ref Range    Hold Specimen [facility]          ASSESSMENT/PLAN:   (Z00.00) Routine general medical examination at a health care facility  (primary encounter diagnosis)  Comment:    Plan:      (F41.1) BLAISE (generalized anxiety disorder)  Comment: given side effects will cut back furthr to 50 milligram daily   Plan: sertraline (ZOLOFT) 50 MG tablet             (K21.00) Gastroesophageal reflux disease with esophagitis without hemorrhage  Comment: can continue with the OTC treatments.   Plan: Basic Metabolic Panel             (J11.1) Influenza-like illness  Comment: improving but not resolved. Will do a burst of prednisone   Plan: predniSONE (DELTASONE) 20 MG tablet             Calcium elevated, repeat this in 6 weeks or so, with a PTH as well.           Counseling  Reviewed preventive health counseling, as reflected in patient instructions       Regular exercise       Healthy  "diet/nutrition      BMI  Estimated body mass index is 27.03 kg/m  as calculated from the following:    Height as of this encounter: 1.74 m (5' 8.5\").    Weight as of this encounter: 81.8 kg (180 lb 6.4 oz).   Weight management plan: Discussed healthy diet and exercise guidelines      He reports that he has never smoked. He has never used smokeless tobacco.            Signed Electronically by: Sotero Quach MD  "

## 2024-01-22 NOTE — NURSING NOTE
"Chief Complaint   Patient presents with    Physical    Heartburn    Nasal Congestion     Patient states that he has been having heart burn for 3 months and has been taking tums and tammy chews with little relief and the famotidine did not help. Patient also states that he has been experiencing come cough and congestion for about a week and has been using his inhaler to help.    Initial /82 (BP Location: Right arm, Patient Position: Sitting, Cuff Size: Adult Regular)   Pulse 79   Temp 97.1  F (36.2  C) (Temporal)   Resp 20   Ht 1.74 m (5' 8.5\")   Wt 81.8 kg (180 lb 6.4 oz)   SpO2 93%   BMI 27.03 kg/m   Estimated body mass index is 27.03 kg/m  as calculated from the following:    Height as of this encounter: 1.74 m (5' 8.5\").    Weight as of this encounter: 81.8 kg (180 lb 6.4 oz).       FOOD SECURITY SCREENING QUESTIONS:    The next two questions are to help us understand your food security.  If you are feeling you need any assistance in this area, we have resources available to support you today.    Hunger Vital Signs:  Within the past 12 months we worried whether our food would run out before we got money to buy more. Never  Within the past 12 months the food we bought just didn't last and we didn't have money to get more. Never  Aspen Vasquez LPN on 1/22/2024 at 10:46 AM     Aspen Vasquez   "

## 2024-01-23 DIAGNOSIS — E83.52 SERUM CALCIUM ELEVATED: Primary | ICD-10-CM

## 2024-02-05 ENCOUNTER — MYC MEDICAL ADVICE (OUTPATIENT)
Dept: FAMILY MEDICINE | Facility: OTHER | Age: 34
End: 2024-02-05
Payer: COMMERCIAL

## 2024-02-05 DIAGNOSIS — F41.1 GAD (GENERALIZED ANXIETY DISORDER): Primary | ICD-10-CM

## 2024-02-07 NOTE — TELEPHONE ENCOUNTER
"LOV with Dr. Quach 1/22/24. From office visit note:  \"Has cut back on zoloft, to 100 milligram. Was not feeling emotions on the high dose, and had trouble with erections on it. Is , but not intending to have children.\"    Minnie Heard RN on 2/7/2024 at 4:22 PM    "

## 2024-02-08 RX ORDER — SERTRALINE HYDROCHLORIDE 100 MG/1
100 TABLET, FILM COATED ORAL DAILY
Qty: 90 TABLET | Refills: 4 | Status: SHIPPED | OUTPATIENT
Start: 2024-02-08

## 2024-07-26 ENCOUNTER — TELEPHONE (OUTPATIENT)
Dept: EMERGENCY MEDICINE | Facility: OTHER | Age: 34
End: 2024-07-26

## 2024-07-26 ENCOUNTER — HOSPITAL ENCOUNTER (EMERGENCY)
Facility: OTHER | Age: 34
Discharge: HOME | End: 2024-07-26
Attending: STUDENT IN AN ORGANIZED HEALTH CARE EDUCATION/TRAINING PROGRAM | Admitting: STUDENT IN AN ORGANIZED HEALTH CARE EDUCATION/TRAINING PROGRAM

## 2024-07-26 VITALS
WEIGHT: 180 LBS | BODY MASS INDEX: 26.66 KG/M2 | SYSTOLIC BLOOD PRESSURE: 112 MMHG | OXYGEN SATURATION: 98 % | HEIGHT: 69 IN | DIASTOLIC BLOOD PRESSURE: 65 MMHG | RESPIRATION RATE: 16 BRPM | TEMPERATURE: 96.5 F | HEART RATE: 60 BPM

## 2024-07-26 DIAGNOSIS — H60.391 INFECTIVE OTITIS EXTERNA, RIGHT: ICD-10-CM

## 2024-07-26 DIAGNOSIS — H60.391 INFECTIVE OTITIS EXTERNA, RIGHT: Primary | ICD-10-CM

## 2024-07-26 PROCEDURE — 99283 EMERGENCY DEPT VISIT LOW MDM: CPT | Performed by: STUDENT IN AN ORGANIZED HEALTH CARE EDUCATION/TRAINING PROGRAM

## 2024-07-26 PROCEDURE — 99282 EMERGENCY DEPT VISIT SF MDM: CPT | Performed by: STUDENT IN AN ORGANIZED HEALTH CARE EDUCATION/TRAINING PROGRAM

## 2024-07-26 RX ORDER — CIPROFLOXACIN AND DEXAMETHASONE 3; 1 MG/ML; MG/ML
3 SUSPENSION/ DROPS AURICULAR (OTIC) 2 TIMES DAILY
Qty: 7.5 ML | Refills: 0 | Status: SHIPPED | OUTPATIENT
Start: 2024-07-26 | End: 2024-08-02

## 2024-07-26 ASSESSMENT — COLUMBIA-SUICIDE SEVERITY RATING SCALE - C-SSRS
1. IN THE PAST MONTH, HAVE YOU WISHED YOU WERE DEAD OR WISHED YOU COULD GO TO SLEEP AND NOT WAKE UP?: NO
6. HAVE YOU EVER DONE ANYTHING, STARTED TO DO ANYTHING, OR PREPARED TO DO ANYTHING TO END YOUR LIFE?: NO
2. HAVE YOU ACTUALLY HAD ANY THOUGHTS OF KILLING YOURSELF IN THE PAST MONTH?: NO

## 2024-07-26 ASSESSMENT — ACTIVITIES OF DAILY LIVING (ADL): ADLS_ACUITY_SCORE: 33

## 2024-07-26 NOTE — DISCHARGE INSTRUCTIONS
Complete eye drop prescription. It includes steroid which should help with itching. Keep ear dry (avoid swimming, getting water in ear) until you have completed antibiotic drops. Get re-evaluated if no improvement by Thursday or not fully resolved after prescription completion.    Please review attached instructions including reasons to return to the emergency department.

## 2024-07-26 NOTE — ED TRIAGE NOTES
"Pt is here today with his wife for right ear pain with bloody drainage.   1st noticed yesterday.   Today when he woke up, he noticed blood on his pillow.   Denies any known injury or hearing loss.     /65   Pulse 60   Temp (!) 96.5  F (35.8  C) (Temporal)   Resp 16   Ht 1.753 m (5' 9\")   Wt 81.6 kg (180 lb)   SpO2 98%   BMI 26.58 kg/m    Chantell Colvin RN on 7/26/2024 at 8:40 AM       Triage Assessment (Adult)       Row Name 07/26/24 0839          Triage Assessment    Airway WDL WDL        Respiratory WDL    Respiratory WDL WDL        Skin Circulation/Temperature WDL    Skin Circulation/Temperature WDL WDL        Cardiac WDL    Cardiac WDL WDL        Peripheral/Neurovascular WDL    Peripheral Neurovascular WDL WDL        Cognitive/Neuro/Behavioral WDL    Cognitive/Neuro/Behavioral WDL WDL                     "

## 2024-07-26 NOTE — TELEPHONE ENCOUNTER
Cipro - Hydrocortisone ear drops are unavailable. Will change to Cipro-Dexamethasone ear drops per CPA with same instructions for use.    No further action is required by provider.    Thank you.    Bri Strauss, PharmD  [facility]

## 2024-07-26 NOTE — ED PROVIDER NOTES
History     Chief Complaint   Patient presents with    Otalgia    Ear Drainage     Bloody       Ramo Freeman is a 33 year old male who presents with right ear pruritus and bloody drainage.  Onset yesterday.  He was tubing with water exposure over the weekend (past couple days).  Denies fever, chills, nausea, vomiting, sinus pressure or drainage, hearing change, significant ear pain.  History does include ear infections.  Antibiotic allergies.    Allergies   Allergen Reactions    Nyquil [Nite Time] Nausea and Vomiting     hyper    [place] Nite Time GI Disturbance     Causes hyperactivity and then emesis       Patient Active Problem List    Diagnosis Date Noted    Calculus of kidney with calculus of ureter 12/31/2020     Priority: Medium    Myopia 01/09/2017     Priority: Medium    Nasal obstruction 01/09/2017     Priority: Medium    Allergic rhinitis 09/08/2016     Priority: Medium    Snoring      Priority: Medium    Acquired nasal deformity 10/08/2015     Priority: Medium    Deviated nasal septum 10/08/2015     Priority: Medium    Nasal valve stenosis 10/08/2015     Priority: Medium    BLAISE (generalized anxiety disorder) 09/09/2015     Priority: Medium    Panic attacks 08/06/2015     Priority: Medium    Refusal of blood transfusions as patient is Buddhist 08/06/2015     Priority: Medium    ETD (eustachian tube dysfunction) 05/20/2013     Priority: Medium    History of myringotomy 05/20/2013     Priority: Medium       Past Medical History:   Diagnosis Date    Acquired pure red cell aplasia (H)     Otitis media     Procedure and treatment not carried out because of patient's decision for reasons of belief and group pressure        Past Surgical History:   Procedure Laterality Date    OTHER SURGICAL HISTORY      06/18/99,[id number],MYRINGOTOMY,and PE tubes times two.       Family History   Problem Relation Age of Onset    Heart Disease Other         Heart Disease       Social History     Tobacco Use    Smoking  "status: Never    Smokeless tobacco: Never   Vaping Use    Vaping status: Never Used   Substance Use Topics    Alcohol use: No    Drug use: Never     Comment: Drug use: No       Medications:    ciprofloxacin-hydrocortisone (CIPRO HC OTIC) 0.2-1 % otic suspension  albuterol (PROAIR HFA/PROVENTIL HFA/VENTOLIN HFA) 108 (90 BASE) MCG/ACT Inhaler  FAMOTIDINE PO  fexofenadine (ALLEGRA) 60 MG tablet  sertraline (ZOLOFT) 100 MG tablet  sertraline (ZOLOFT) 50 MG tablet  VITAMIN D, CHOLECALCIFEROL, PO        Review of Systems: See HPI for pertinent negatives and positives. All other systems reviewed and found to be negative.    Physical Exam   /65   Pulse 60   Temp (!) 96.5  F (35.8  C) (Temporal)   Resp 16   Ht 1.753 m (5' 9\")   Wt 81.6 kg (180 lb)   SpO2 98%   BMI 26.58 kg/m       General: awake, comfortable  HEENT: atraumatic, TMs gray translucent without bulge, normal.  Left auditory canal, right auditory canal with mild edema and tissue irritation with trace blood present and mild discomfort during speculum insertion  Respiratory: normal effort, clear to auscultation bilaterally  Cardiovascular: regular rate and rhythm, no murmurs  Abdomen: soft, nondistended, nontender  Extremities: no deformities, edema, or tenderness  Skin: warm, dry, no rashes  Neuro: alert, no focal deficits  Psych: appropriate mood and affect    ED Course      No results found for this or any previous visit (from the past 24 hour(s)).    Medications - No data to display    Assessments & Plan (with Medical Decision Making)     I have reviewed the nursing notes.          33 year old male evaluated for bloody discharge from right ear and pruritus in his right ear after water exposure tubing over the past couple days.  TMs appear uninfected.  Right auditory canal with mild edema and discomfort and trace blood.  Plan to treat for otitis externa. No indication for systemic antibiotics.    I have reviewed the findings, diagnosis, plan, and " need for any follow up with the patient.    Patient instructions:   Complete eye drop prescription. It includes steroid which should help with itching. Keep ear dry (avoid swimming, getting water in ear) until you have completed antibiotic drops. Get re-evaluated if no improvement by Thursday or not fully resolved after prescription completion.    Please review attached instructions including reasons to return to the emergency department.     New Prescriptions    CIPROFLOXACIN-HYDROCORTISONE (CIPRO HC OTIC) 0.2-1 % OTIC SUSPENSION    Place 3 drops into the right ear 2 times daily for 7 days       Final diagnoses:   Infective otitis externa, right       7/26/2024   [facility]     Juma Madden MD  07/26/24 [id number]

## 2024-08-14 ENCOUNTER — HOSPITAL ENCOUNTER (EMERGENCY)
Facility: OTHER | Age: 34
End: 2024-08-14

## 2024-10-21 ENCOUNTER — HOSPITAL ENCOUNTER (EMERGENCY)
Facility: OTHER | Age: 34
Discharge: HOME | End: 2024-10-21
Payer: OTHER MISCELLANEOUS

## 2024-10-21 VITALS
BODY MASS INDEX: 26.66 KG/M2 | HEIGHT: 69 IN | WEIGHT: 180 LBS | HEART RATE: 67 BPM | TEMPERATURE: 98.1 F | OXYGEN SATURATION: 95 % | RESPIRATION RATE: 18 BRPM | DIASTOLIC BLOOD PRESSURE: 76 MMHG | SYSTOLIC BLOOD PRESSURE: 128 MMHG
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
## 2024-10-21 LAB
ALBUMIN SERPL BCG-MCNC: 4.9 G/DL (ref 3.5–5.2)
ALP SERPL-CCNC: 106 U/L (ref 40–150)
ALT SERPL W P-5'-P-CCNC: 61 U/L (ref 0–70)
ANION GAP SERPL CALCULATED.3IONS-SCNC: 11 MMOL/L (ref 7–15)
AST SERPL W P-5'-P-CCNC: 28 U/L (ref 0–45)
BASOPHILS # BLD AUTO: 0 10E3/UL (ref 0–0.2)
BASOPHILS NFR BLD AUTO: 1 %
BILIRUB DIRECT SERPL-MCNC: <0.2 MG/DL (ref 0–0.3)
BILIRUB SERPL-MCNC: 0.6 MG/DL
BUN SERPL-MCNC: 15.4 MG/DL (ref 6–20)
CALCIUM SERPL-MCNC: 9.6 MG/DL (ref 8.8–10.4)
CHLORIDE SERPL-SCNC: 101 MMOL/L (ref 98–107)
CREAT SERPL-MCNC: 0.97 MG/DL (ref 0.67–1.17)
EGFRCR SERPLBLD CKD-EPI 2021: >90 ML/MIN/1.73M2
EOSINOPHIL # BLD AUTO: 0.1 10E3/UL (ref 0–0.7)
EOSINOPHIL NFR BLD AUTO: 1 %
ERYTHROCYTE [DISTWIDTH] IN BLOOD BY AUTOMATED COUNT: 12.5 % (ref 10–15)
GLUCOSE SERPL-MCNC: 95 MG/DL (ref 70–99)
HCO3 SERPL-SCNC: 25 MMOL/L (ref 22–29)
HCT VFR BLD AUTO: 42.7 % (ref 40–53)
HGB BLD-MCNC: 15.5 G/DL (ref 13.3–17.7)
HOLD SPECIMEN: NORMAL
IMM GRANULOCYTES # BLD: 0 10E3/UL
IMM GRANULOCYTES NFR BLD: 0 %
LYMPHOCYTES # BLD AUTO: 2.5 10E3/UL (ref 0.8–5.3)
LYMPHOCYTES NFR BLD AUTO: 33 %
MCH RBC QN AUTO: 29.2 PG (ref 26.5–33)
MCHC RBC AUTO-ENTMCNC: 36.3 G/DL (ref 31.5–36.5)
MCV RBC AUTO: 80 FL (ref 78–100)
MONOCYTES # BLD AUTO: 0.7 10E3/UL (ref 0–1.3)
MONOCYTES NFR BLD AUTO: 9 %
NEUTROPHILS # BLD AUTO: 4.2 10E3/UL (ref 1.6–8.3)
NEUTROPHILS NFR BLD AUTO: 56 %
NRBC # BLD AUTO: 0 10E3/UL
NRBC BLD AUTO-RTO: 0 /100
PLATELET # BLD AUTO: 367 10E3/UL (ref 150–450)
POTASSIUM SERPL-SCNC: 3.8 MMOL/L (ref 3.4–5.3)
PROT SERPL-MCNC: 7.7 G/DL (ref 6.4–8.3)
RBC # BLD AUTO: 5.31 10E6/UL (ref 4.4–5.9)
SODIUM SERPL-SCNC: 137 MMOL/L (ref 135–145)
WBC # BLD AUTO: 7.5 10E3/UL (ref 4–11)

## 2024-10-21 PROCEDURE — 82248 BILIRUBIN DIRECT: CPT

## 2024-10-21 PROCEDURE — 99284 EMERGENCY DEPT VISIT MOD MDM: CPT

## 2024-10-21 PROCEDURE — 85004 AUTOMATED DIFF WBC COUNT: CPT

## 2024-10-21 PROCEDURE — 36415 COLL VENOUS BLD VENIPUNCTURE: CPT

## 2024-10-21 PROCEDURE — 99283 EMERGENCY DEPT VISIT LOW MDM: CPT

## 2024-10-21 ASSESSMENT — COLUMBIA-SUICIDE SEVERITY RATING SCALE - C-SSRS
6. HAVE YOU EVER DONE ANYTHING, STARTED TO DO ANYTHING, OR PREPARED TO DO ANYTHING TO END YOUR LIFE?: NO
1. IN THE PAST MONTH, HAVE YOU WISHED YOU WERE DEAD OR WISHED YOU COULD GO TO SLEEP AND NOT WAKE UP?: NO
2. HAVE YOU ACTUALLY HAD ANY THOUGHTS OF KILLING YOURSELF IN THE PAST MONTH?: NO

## 2024-10-21 ASSESSMENT — ACTIVITIES OF DAILY LIVING (ADL): ADLS_ACUITY_SCORE: 35

## 2024-10-21 NOTE — ED PROVIDER NOTES
History     Chief Complaint   Patient presents with    Needle Stick     The history is provided by the patient and medical records.     Ramo Freeman is a 33 year old male who presents to the ER today as recommended by employee health, infection control. Patient works here, [employer], in environmental services. He was taking garbage out on Saturday at work when he got stuck by a needle in his left ring finger. The finger bled. He followed up with the supervisor and had his blood work taken on Saturday for hepatitis, HIV send out testing. He is requesting HIV PEP due to his exposure being high risk. Employee health RN came down with policy from [place]: recommended labs today CBC, Liver function tests, BMP. Recommended treatment is truvada (emtricitabine/tenofovir 200-300mg and Tivivay (Dolutegravir) 50mg) for 5 days. She otherwise has filled out appropriate paperwork.   This is work comp    Allergies:  Allergies   Allergen Reactions    Nyquil [Nite Time] Nausea and Vomiting     hyper    [place] Nite Time GI Disturbance     Causes hyperactivity and then emesis       Problem List:    Patient Active Problem List    Diagnosis Date Noted    Calculus of kidney with calculus of ureter 12/31/2020     Priority: Medium    Myopia 01/09/2017     Priority: Medium    Nasal obstruction 01/09/2017     Priority: Medium    Allergic rhinitis 09/08/2016     Priority: Medium    Snoring      Priority: Medium    Acquired nasal deformity 10/08/2015     Priority: Medium    Deviated nasal septum 10/08/2015     Priority: Medium    Nasal valve stenosis 10/08/2015     Priority: Medium    BLAISE (generalized anxiety disorder) 09/09/2015     Priority: Medium    Panic attacks 08/06/2015     Priority: Medium    Procedure and treatment not carried out because of patient's decision for reasons of belief and group pressure 08/06/2015     Priority: Medium    ETD (eustachian tube dysfunction) 05/20/2013     Priority: Medium    History of myringotomy  "05/20/2013     Priority: Medium        Past Medical History:    Past Medical History:   Diagnosis Date    Acquired pure red cell aplasia (H)     Otitis media     Procedure and treatment not carried out because of patient's decision for reasons of belief and group pressure        Past Surgical History:    Past Surgical History:   Procedure Laterality Date    OTHER SURGICAL HISTORY      06/18/99,[id number],MYRINGOTOMY,and PE tubes times two.       Family History:    Family History   Problem Relation Age of Onset    Heart Disease Other         Heart Disease       Social History:  Marital Status:   [2]  Social History     Tobacco Use    Smoking status: Never    Smokeless tobacco: Never   Vaping Use    Vaping status: Never Used   Substance Use Topics    Alcohol use: No    Drug use: Never     Comment: Drug use: No        Medications:    albuterol (PROAIR HFA/PROVENTIL HFA/VENTOLIN HFA) 108 (90 BASE) MCG/ACT Inhaler  FAMOTIDINE PO  fexofenadine (ALLEGRA) 60 MG tablet  sertraline (ZOLOFT) 100 MG tablet  sertraline (ZOLOFT) 50 MG tablet  VITAMIN D, CHOLECALCIFEROL, PO          Review of Systems   All other systems reviewed and are negative.  Here for labs, meds    Physical Exam   BP: 128/76  Pulse: 67  Temp: 98.1  F (36.7  C)  Resp: 18  Height: 175.3 cm (5' 9\")  Weight: 81.6 kg (180 lb)  SpO2: 95 %      Physical Exam  Vitals and nursing note reviewed.   Constitutional:       General: He is not in acute distress.     Appearance: He is not ill-appearing or toxic-appearing.   HENT:      Head: Normocephalic.      Nose: Nose normal.      Mouth/Throat:      Mouth: Mucous membranes are moist.   Cardiovascular:      Rate and Rhythm: Normal rate and regular rhythm.      Pulses: Normal pulses.   Pulmonary:      Effort: Pulmonary effort is normal.   Abdominal:      Palpations: Abdomen is soft.   Musculoskeletal:         General: Normal range of motion.      Cervical back: Neck supple.   Skin:     General: Skin is warm.      " Capillary Refill: Capillary refill takes less than 2 seconds.      Comments: Left ring finger free of s/s infection   Neurological:      General: No focal deficit present.      Mental Status: He is alert.   Psychiatric:         Mood and Affect: Mood normal.         Behavior: Behavior normal.         ED Course         Results for orders placed or performed during the hospital encounter of 10/21/24 (from the past 24 hour(s))   Hepatic function panel   Result Value Ref Range    Protein Total 7.7 6.4 - 8.3 g/dL    Albumin 4.9 3.5 - 5.2 g/dL    Bilirubin Total 0.6 <=1.2 mg/dL    Alkaline Phosphatase 106 40 - 150 U/L    AST 28 0 - 45 U/L    ALT 61 0 - 70 U/L    Bilirubin Direct <0.20 0.00 - 0.30 mg/dL   Basic metabolic panel   Result Value Ref Range    Sodium 137 135 - 145 mmol/L    Potassium 3.8 3.4 - 5.3 mmol/L    Chloride 101 98 - 107 mmol/L    Carbon Dioxide (CO2) 25 22 - 29 mmol/L    Anion Gap 11 7 - 15 mmol/L    Urea Nitrogen 15.4 6.0 - 20.0 mg/dL    Creatinine 0.97 0.67 - 1.17 mg/dL    GFR Estimate >90 >60 mL/min/1.73m2    Calcium 9.6 8.8 - 10.4 mg/dL    Glucose 95 70 - 99 mg/dL   CBC with platelets differential    Narrative    The following orders were created for panel order CBC with platelets differential.  Procedure                               Abnormality         Status                     ---------                               -----------         ------                     CBC with platelets and d...[[id number]]                      Final result                 Please view results for these tests on the individual orders.   CBC with platelets and differential   Result Value Ref Range    WBC Count 7.5 4.0 - 11.0 10e3/uL    RBC Count 5.31 4.40 - 5.90 10e6/uL    Hemoglobin 15.5 13.3 - 17.7 g/dL    Hematocrit 42.7 40.0 - 53.0 %    MCV 80 78 - 100 fL    MCH 29.2 26.5 - 33.0 pg    MCHC 36.3 31.5 - 36.5 g/dL    RDW 12.5 10.0 - 15.0 %    Platelet Count 367 150 - 450 10e3/uL    % Neutrophils 56 %    % Lymphocytes 33  %    % Monocytes 9 %    % Eosinophils 1 %    % Basophils 1 %    % Immature Granulocytes 0 %    NRBCs per 100 WBC 0 <1 /100    Absolute Neutrophils 4.2 1.6 - 8.3 10e3/uL    Absolute Lymphocytes 2.5 0.8 - 5.3 10e3/uL    Absolute Monocytes 0.7 0.0 - 1.3 10e3/uL    Absolute Eosinophils 0.1 0.0 - 0.7 10e3/uL    Absolute Basophils 0.0 0.0 - 0.2 10e3/uL    Absolute Immature Granulocytes 0.0 <=0.4 10e3/uL    Absolute NRBCs 0.0 10e3/uL   Extra Tube    Narrative    The following orders were created for panel order Extra Tube.  Procedure                               Abnormality         Status                     ---------                               -----------         ------                     Extra Blue Top Tube[[id number]]                              In process                 Extra Red Top Tube[[id number]]                               In process                 Extra Green Top (Lithium...[[id number]]                      In process                   Please view results for these tests on the individual orders.       Medications   dolutegravir  (TIVICAY) tablet 50 mg - HIV PEP Kit Component (has no administration in time range)   emtricitabine-tenofovir (TRUVADA) - HIV PEP Kit Component (has no administration in time range)       Assessments & Plan (with Medical Decision Making)  Ramo Freeman is a 33 year old male who presents to the ER today as recommended by employee health, infection control. 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 The finger bled. He followed up with the supervisor and had his blood work taken on Saturday for hepatitis, HIV. He is requesting HIV PEP due to his exposure being high risk. Employee health came down with policy from [place]: recommended labs today CBC, Liver function tests, BMP. Recommended treatment is truvada (emtricitabine/tenofovir 200-300mg and Tivivay  (Dolutegravir) 50mg) for 5 days.   This is work comp.   Unremarkable physical exam. Vitally stable.   Here for HIV PEP  Labs & Radiology results interpreted by radiologist:   ED Course as of 10/21/24 1825   Mon Oct 21, 2024   1738 CBC with platelets differential  ok   1758 Basic metabolic panel  normal   1758 Hepatic function panel  normal      Per UpToDate HIV PEP medication monitoring:  Side effects -- Integrase inhibitor-based PEP regimens typically have few side effects. PEP with TDF/FTC plus dolutegravir is well tolerated, with mild gastrointestinal symptoms and headache the most commonly reported side effects [45]. Dolutegravir can also be associated with insomnia, particularly when dosed in the evening; other neuropsychiatric symptoms, such as dizziness, anxiety, and depression, have been reported but are uncommon [46].    Laboratory monitoring -- Patients receiving PEP should be monitored for drug toxicity.  Inpatient pharmacist did speak with patient regarding side effects today as well.   He is given the full 5 day dose from our pharmacy today. [facility] to follow with other test results, further follow-up needs.   He is discharged home in stable condition.      I have reviewed the nursing notes.    I have reviewed the findings, diagnosis, plan and need for follow up with the patient.    Medical Decision Making  The patient's presentation was of straightforward complexity (a clearly self-limited or minor problem).    The patient's evaluation involved:  ordering and/or review of 3+ test(s) in this encounter (see separate area of note for details)  discussion of management or test interpretation with another health professional (see separate area of note for details)    The patient's management necessitated moderate risk (prescription drug management including medications given in the ED).        New Prescriptions    No medications on file       Final diagnoses:   Accidental hypodermic needlestick  injury       10/21/2024   [facility], APRN CNP  10/21/24 2019

## 2024-10-21 NOTE — ED TRIAGE NOTES
Patient comes in for labs and treatment following a needle stick while taking garbage out of a bin at work.  Unknown where the needle came from.  Concern for infection prevention.     Triage Assessment (Adult)       Row Name 10/21/24 1369          Triage Assessment    Airway WDL WDL        Respiratory WDL    Respiratory WDL WDL        Skin Circulation/Temperature WDL    Skin Circulation/Temperature WDL WDL        Cardiac WDL    Cardiac WDL WDL        Peripheral/Neurovascular WDL    Peripheral Neurovascular WDL WDL        Cognitive/Neuro/Behavioral WDL    Cognitive/Neuro/Behavioral WDL WDL

## 2024-10-21 NOTE — DISCHARGE INSTRUCTIONS
Side effects of medications include:   Side effects -- Integrase inhibitor-based PEP regimens typically have few side effects. PEP with TDF/FTC plus dolutegravir is well tolerated, with mild gastrointestinal symptoms and headache the most commonly reported side effects [45]. Dolutegravir can also be associated with insomnia, particularly when dosed in the evening; other neuropsychiatric symptoms, such as dizziness, anxiety, and depression, have been reported but are uncommon    Employee health to follow with test results.

## 2024-10-21 NOTE — ED NOTES
Medication packs for Tivicay and Truvada administration provided to patient with educated on its use.

## 2024-10-30 ENCOUNTER — OFFICE VISIT (OUTPATIENT)
Dept: FAMILY MEDICINE | Facility: OTHER | Age: 34
End: 2024-10-30
Attending: STUDENT IN AN ORGANIZED HEALTH CARE EDUCATION/TRAINING PROGRAM
Payer: COMMERCIAL

## 2024-10-30 VITALS
SYSTOLIC BLOOD PRESSURE: 112 MMHG | DIASTOLIC BLOOD PRESSURE: 76 MMHG | WEIGHT: 179.4 LBS | HEIGHT: 69 IN | RESPIRATION RATE: 18 BRPM | OXYGEN SATURATION: 96 % | TEMPERATURE: 97.4 F | HEART RATE: 79 BPM | BODY MASS INDEX: 26.57 KG/M2

## 2024-10-30 DIAGNOSIS — J02.9 SORE THROAT: Primary | ICD-10-CM

## 2024-10-30 DIAGNOSIS — R05.1 ACUTE COUGH: ICD-10-CM

## 2024-10-30 LAB
FLUAV RNA SPEC QL NAA+PROBE: NEGATIVE
FLUBV RNA RESP QL NAA+PROBE: NEGATIVE
GROUP A STREP BY PCR: NOT DETECTED
RSV RNA SPEC NAA+PROBE: NEGATIVE
SARS-COV-2 RNA RESP QL NAA+PROBE: NEGATIVE

## 2024-10-30 PROCEDURE — 87651 STREP A DNA AMP PROBE: CPT | Mod: ZL | Performed by: STUDENT IN AN ORGANIZED HEALTH CARE EDUCATION/TRAINING PROGRAM

## 2024-10-30 PROCEDURE — 87637 SARSCOV2&INF A&B&RSV AMP PRB: CPT | Mod: ZL | Performed by: STUDENT IN AN ORGANIZED HEALTH CARE EDUCATION/TRAINING PROGRAM

## 2024-10-30 PROCEDURE — 99213 OFFICE O/P EST LOW 20 MIN: CPT | Performed by: STUDENT IN AN ORGANIZED HEALTH CARE EDUCATION/TRAINING PROGRAM

## 2024-10-30 RX ORDER — BENZONATATE 100 MG/1
CAPSULE ORAL
Qty: 30 CAPSULE | Refills: 0 | Status: SHIPPED | OUTPATIENT
Start: 2024-10-30 | End: 2024-11-06

## 2024-10-30 ASSESSMENT — PAIN SCALES - GENERAL: PAINLEVEL_OUTOF10: NO PAIN (0)

## 2024-10-30 NOTE — NURSING NOTE
"Chief Complaint   Patient presents with    Covid 19 Testing    Pharyngitis    Cough    Fever   Patient presents to the rapid clinic today for concerns of a cough, sore throat, fever and wanting COVID-19 testing. Patent states symptoms started Monday.     Initial /76 (BP Location: Left arm, Patient Position: Sitting, Cuff Size: Adult Regular)   Pulse 79   Temp 97.4  F (36.3  C) (Temporal)   Resp 18   Ht 1.753 m (5' 9\")   Wt 81.4 kg (179 lb 6.4 oz)   SpO2 96%   BMI 26.49 kg/m   Estimated body mass index is 26.49 kg/m  as calculated from the following:    Height as of this encounter: 1.753 m (5' 9\").    Weight as of this encounter: 81.4 kg (179 lb 6.4 oz).  Medication Review: complete    The next two questions are to help us understand your food security.  If you are feeling you need any assistance in this area, we have resources available to support you today.          10/30/2024   SDOH- Food Insecurity   Within the past 12 months, did you worry that your food would run out before you got money to buy more? N   Within the past 12 months, did the food you bought just not last and you didn t have money to get more? N              Health Care Directive:  Patient has a Health Care Directive on file      Ajith Concepcion      "

## 2024-10-30 NOTE — PATIENT INSTRUCTIONS
Cough    COVID, influenza, RSV, and strep pending. Results will be available later today.    Tessalon Perles for cough.  Robitussin for suppression of cough.  Mucinex to thin secretions.  Sudafed for congestion.    Lots of fluids.  Cough drops.  Tea with honey.     Follow-up with primary care if symptoms persist.  Return to rapid clinic or go to the emergency room if symptoms worsen or change.

## 2024-10-30 NOTE — PROGRESS NOTES
"  Assessment & Plan     (J02.9) Sore throat  (primary encounter diagnosis)    Comment: Viral URI.  Symptoms x 2 days.  Vital signs are stable.  COVID, influenza, RSV, and strep are all negative today.  Tolerating oral intake.    Plan: Group A Streptococcus PCR Throat Swab,         Symptomatic Influenza A/B, RSV, & SARS-CoV2 PCR        (COVID-19) Nose          Continue outpatient management.  Follow-up with PCP for any persisting symptoms.  Return to rapid clinic or ER for any worsening or changing symptoms.  He is comfortable and agreeable with this plan.    (R05.1) Acute cough  Comment: Viral illness.  Plan: benzonatate (TESSALON) 100 MG capsule    Tessalon Perles as needed for cough.  Continue over-the-counter management.  Follow-up as needed.    Margot Ralph is a 33 year old, presenting for the following health issues:  Covid 19 Testing, Pharyngitis, Cough, and Fever    HPI    Patient presents today with concerns of sore throat, cough, congestion.  He states symptoms started about 2 days ago.  They have been somewhat persistent.  He does note he has been more fatigued, is somewhat fatigued at baseline right now.  He has been using Chloraseptic spray, cough drops, and emergency.  He notes that he does work here, has been exposed to many different illnesses.      Review of Systems  Constitutional, HEENT, cardiovascular, pulmonary, gi and gu systems are negative, except as otherwise noted.        Objective    /76 (BP Location: Left arm, Patient Position: Sitting, Cuff Size: Adult Regular)   Pulse 79   Temp 97.4  F (36.3  C) (Temporal)   Resp 18   Ht 1.753 m (5' 9\")   Wt 81.4 kg (179 lb 6.4 oz)   SpO2 96%   BMI 26.49 kg/m    Body mass index is 26.49 kg/m .    Physical Exam   GENERAL: alert and no distress  EYES: Eyes grossly normal to inspection, PERRL and conjunctivae and sclerae normal  HENT: ear canals and TM's normal, nose and mouth without ulcers or lesions  NECK: no adenopathy, no asymmetry, " masses, or scars  RESP: lungs clear to auscultation - no rales, rhonchi or wheezes  CV: regular rate and rhythm, normal S1 S2  MS: no gross musculoskeletal defects noted, no edema    Results for orders placed or performed in visit on 10/30/24   Symptomatic Influenza A/B, RSV, & SARS-CoV2 PCR (COVID-19) Nose     Status: Normal    Specimen: Nose; Swab   Result Value Ref Range    Influenza A PCR Negative Negative    Influenza B PCR Negative Negative    RSV PCR Negative Negative    SARS CoV2 PCR Negative Negative    Narrative    Testing was performed using the Xpert Xpress CoV2/Flu/RSV Assay on the [employer] Instrument. This test should be ordered for the detection of SARS-CoV2, influenza, and RSV viruses in individuals with signs and symptoms of respiratory tract infection. This test is for in vitro diagnostic use under the [place] FDA for laboratories certified under CLIA to perform high or moderate complexity testing. This test has been [place] FDA cleared. A negative result does not rule out the presence of PCR inhibitors in the specimen or target RNA in concentration below the limit of detection for the assay. If only one viral target is positive but coinfection with multiple targets is suspected, the sample should be re-tested with another FDA cleared, approved, or authorized test, if coninfection would change clinical management. This test was validated by the [facility]. These laboratories are certified under the Clinical Laboratory Improvement Amendments of 1988 (CLIA-88) as qualified to perfom high complexity laboratory testing.   Group A Streptococcus PCR Throat Swab     Status: Normal    Specimen: Throat; Swab   Result Value Ref Range    Group A strep by PCR Not Detected Not Detected    Narrative    The Xpert Xpress Strep A test, performed on the [employer]  Instrument Systems, is a rapid, qualitative in vitro diagnostic test for the detection of Streptococcus pyogenes (Group A ß-hemolytic  Streptococcus, Strep A) in throat swab specimens from patients with signs and symptoms of pharyngitis. The Xpert Xpress Strep A test can be used as an aid in the diagnosis of Group A Streptococcal pharyngitis. The assay is not intended to monitor treatment for Group A Streptococcus infections. The Xpert Xpress Strep A test utilizes an automated real-time polymerase chain reaction (PCR) to detect Streptococcus pyogenes DNA.         Signed Electronically by: Erlinda Hagen PA-C

## 2025-04-01 DIAGNOSIS — F41.1 GAD (GENERALIZED ANXIETY DISORDER): ICD-10-CM

## 2025-04-02 RX ORDER — SERTRALINE HYDROCHLORIDE 100 MG/1
100 TABLET, FILM COATED ORAL DAILY
Qty: 90 TABLET | Refills: 0 | Status: SHIPPED | OUTPATIENT
Start: 2025-04-02

## 2025-04-02 NOTE — TELEPHONE ENCOUNTER
[facility] sent Rx request for the following:      Requested Prescriptions   Pending Prescriptions Disp Refills    sertraline (ZOLOFT) 100 MG tablet [Pharmacy Med Name: SERTRALINE  MG TABLET] 90 tablet 4     Sig: TAKE 1 TABLET BY MOUTH EVERY DAY       SSRIs Protocol Failed - 4/2/2025 10:24 AM        Failed - BLAISE-7 score of less than 5 in past 6 months.     Please review last BLAISE-7 score.       1/8/2021     2:49 PM 1/20/2023     3:31 PM 5/4/2023     9:43 AM   BLAISE-7 SCORE   Total Score   0 (minimal anxiety)   Total Score 0 0 0    0        BLAISE (generalized anxiety disorder) [F41.1]  - Primary     Last Prescription Date:   2/8/24  Last Fill Qty/Refills:         90, R-4    Last Office Visit:              1/22/24   Future Office visit:            None    Per LOV note:  Return in about 1 year (around 1/22/2025) for Preventive Visit     Unable to complete prescription refill per RN Medication Refill Policy.     Pt due for annual. Routing to provider for refill consideration. Routing to Unit scheduling pool, to assist Pt in scheduling appointment.       Mina Askew RN on 4/2/2025 at 10:25 AM

## 2025-04-02 NOTE — TELEPHONE ENCOUNTER
Patient returned call.  He now lives in [place], [place].  He is requesting that the prescription be transferred to his [facility] down there.    [facility]  [id number] [place] [address]    Dena Kern on 4/2/2025 at 12:36 PM